# Patient Record
Sex: MALE | Race: BLACK OR AFRICAN AMERICAN | NOT HISPANIC OR LATINO | Employment: FULL TIME | URBAN - METROPOLITAN AREA
[De-identification: names, ages, dates, MRNs, and addresses within clinical notes are randomized per-mention and may not be internally consistent; named-entity substitution may affect disease eponyms.]

---

## 2017-03-07 DIAGNOSIS — I10 HTN, GOAL BELOW 140/90: Primary | ICD-10-CM

## 2017-03-07 DIAGNOSIS — I10 ESSENTIAL HYPERTENSION: ICD-10-CM

## 2017-03-07 RX ORDER — AMLODIPINE BESYLATE 10 MG/1
TABLET ORAL
Qty: 45 TABLET | Refills: 0 | Status: SHIPPED | OUTPATIENT
Start: 2017-03-07 | End: 2017-03-07 | Stop reason: DRUGHIGH

## 2017-03-07 RX ORDER — AMLODIPINE BESYLATE 5 MG/1
5 TABLET ORAL DAILY
Qty: 90 TABLET | Refills: 3 | Status: SHIPPED | OUTPATIENT
Start: 2017-03-07 | End: 2018-03-19

## 2017-03-07 NOTE — TELEPHONE ENCOUNTER
Amlodipine 10 mg      Last Written Prescription Date: 03/02/16 (the order from 11/23/16 was historical and not sent to any pharmacy)  Last Fill Quantity: 90, # refills: 3    Last Office Visit with G, P or [facility] prescribing provider:  11/23/16   Future Office Visit:        BP Readings from Last 3 Encounters:   11/23/16 136/85   09/08/16 129/82   06/08/16 (!) 134/92       It looks like the strength was decreased to 5 mg at the 11/23/16 OV. Please send a new order for the current dose. Thanks!    Myah Lyons CPhT      [facility]  [address] [place] [address]    Phone [phone]  Fax [phone]

## 2017-04-11 DIAGNOSIS — I10 HTN, GOAL BELOW 140/90: ICD-10-CM

## 2017-11-27 ENCOUNTER — APPOINTMENT (OUTPATIENT)
Dept: GENERAL RADIOLOGY | Facility: CLINIC | Age: 42
End: 2017-11-27
Attending: EMERGENCY MEDICINE

## 2017-11-27 ENCOUNTER — HOSPITAL ENCOUNTER (EMERGENCY)
Facility: CLINIC | Age: 42
Discharge: HOME | End: 2017-11-27
Attending: EMERGENCY MEDICINE | Admitting: EMERGENCY MEDICINE

## 2017-11-27 VITALS
RESPIRATION RATE: 16 BRPM | DIASTOLIC BLOOD PRESSURE: 92 MMHG | HEART RATE: 105 BPM | SYSTOLIC BLOOD PRESSURE: 131 MMHG | TEMPERATURE: 98.2 F | WEIGHT: 187.4 LBS | OXYGEN SATURATION: 94 % | BODY MASS INDEX: 26.89 KG/M2

## 2017-11-27 DIAGNOSIS — J18.9 PNEUMONIA OF RIGHT MIDDLE LOBE DUE TO INFECTIOUS ORGANISM: ICD-10-CM

## 2017-11-27 PROCEDURE — 71020 XR CHEST 2 VW: CPT

## 2017-11-27 PROCEDURE — 25000132 ZZH RX MED GY IP 250 OP 250 PS 637: Performed by: EMERGENCY MEDICINE

## 2017-11-27 PROCEDURE — 99283 EMERGENCY DEPT VISIT LOW MDM: CPT | Mod: 25 | Performed by: EMERGENCY MEDICINE

## 2017-11-27 PROCEDURE — 99284 EMERGENCY DEPT VISIT MOD MDM: CPT | Mod: Z6 | Performed by: EMERGENCY MEDICINE

## 2017-11-27 RX ORDER — IBUPROFEN 600 MG/1
600 TABLET, FILM COATED ORAL ONCE
Status: COMPLETED | OUTPATIENT
Start: 2017-11-27 | End: 2017-11-27

## 2017-11-27 RX ORDER — ACETAMINOPHEN 325 MG/1
975 TABLET ORAL ONCE
Status: COMPLETED | OUTPATIENT
Start: 2017-11-27 | End: 2017-11-27

## 2017-11-27 RX ORDER — DOXYCYCLINE 100 MG/1
100 CAPSULE ORAL 2 TIMES DAILY
Qty: 14 CAPSULE | Refills: 0 | Status: SHIPPED | OUTPATIENT
Start: 2017-11-27 | End: 2017-12-04

## 2017-11-27 RX ADMIN — IBUPROFEN 600 MG: 600 TABLET ORAL at 19:43

## 2017-11-27 RX ADMIN — ACETAMINOPHEN 975 MG: 325 TABLET, FILM COATED ORAL at 19:43

## 2017-11-27 ASSESSMENT — ENCOUNTER SYMPTOMS
ABDOMINAL PAIN: 0
FEVER: 0
COUGH: 1
CONFUSION: 0
DIFFICULTY URINATING: 0
ARTHRALGIAS: 0
SLEEP DISTURBANCE: 1
COLOR CHANGE: 0
SHORTNESS OF BREATH: 0
NECK STIFFNESS: 0
HEADACHES: 1
EYE REDNESS: 0

## 2017-11-27 NOTE — ED AVS SNAPSHOT
[facility], [place], Emergency Department    [address]    [facility] [address]    Phone:  [phone]    Fax:  [phone]                                       Vasiliy Hay   MRN: [id number]    Department:  [facility], Emergency Department   Date of Visit:  11/27/2017           After Visit Summary Signature Page     I have received my discharge instructions, and my questions have been answered. I have discussed any challenges I see with this plan with the nurse or doctor.    ..........................................................................................................................................  Patient/Patient Representative Signature      ..........................................................................................................................................  Patient Representative Print Name and Relationship to Patient    ..................................................               ................................................  Date                                            Time    ..........................................................................................................................................  Reviewed by Signature/Title    ...................................................              ..............................................  Date                                                            Time

## 2017-11-27 NOTE — ED AVS SNAPSHOT
[facility], Emergency Department    [address]    [place] [address]    Phone:  [phone]    Fax:  [phone]                                       Vasiliy Hay   MRN: [id number]    Department:  [facility], Emergency Department   Date of Visit:  11/27/2017           Patient Information     Date Of Birth          1975        Your diagnoses for this visit were:     Pneumonia of right middle lobe due to infectious organism (H)        You were seen by Frederic Garrido MD.        Discharge Instructions       You have pneumonia in the right lung  Start antibiotics as directed  Follow-up with your doctor in 1 week    24 Hour Appointment Hotline       To make an appointment at any [facility] clinic, call [phone]). If you don't have a family doctor or clinic, we will help you find one. [facility] clinics are conveniently located to serve the needs of you and your family.             Review of your medicines      START taking        Dose / Directions Last dose taken    doxycycline 100 MG capsule   Commonly known as:  VIBRAMYCIN   Dose:  100 mg   Quantity:  14 capsule        Take 1 capsule (100 mg) by mouth 2 times daily for 7 days   Refills:  0          Our records show that you are taking the medicines listed below. If these are incorrect, please call your family doctor or clinic.        Dose / Directions Last dose taken    amLODIPine 5 MG tablet   Commonly known as:  NORVASC   Dose:  5 mg   Quantity:  90 tablet        Take 1 tablet (5 mg) by mouth daily   Refills:  3        TUMS PO        Take  by mouth as needed.   Refills:  0                Prescriptions were sent or printed at these locations (1 Prescription)                   Other Prescriptions                Printed at Department/Unit printer (1 of 1)         doxycycline (VIBRAMYCIN) 100 MG capsule                Procedures and tests performed during your visit     XR Chest 2 Views      Orders Needing  Specimen Collection     None      Pending Results     Date and Time Order Name Status Description    11/27/2017 1858 XR Chest 2 Views Preliminary             Pending Culture Results     No orders found from 11/25/2017 to 11/28/2017.            Pending Results Instructions     If you had any lab results that were not finalized at the time of your Discharge, you can call the ED Lab Result RN at [phone]. You will be contacted by this team for any positive Lab results or changes in treatment. The nurses are available 7 days a week from 10A to 6:30P.  You can leave a message 24 hours per day and they will return your call.        Thank you for choosing [facility]       Thank you for choosing [facility] for your care. Our goal is always to provide you with excellent care. Hearing back from our patients is one way we can continue to improve our services. Please take a few minutes to complete the written survey that you may receive in the mail after you visit with us. Thank you!        [employer] Information     [employer] gives you secure access to your electronic health record. If you see a primary care provider, you can also send messages to your care team and make appointments. If you have questions, please call your primary care clinic.  If you do not have a primary care provider, please call [phone] and they will assist you.        Care EveryWhere ID     This is your Care EveryWhere ID. This could be used by other organizations to access your [facility] medical records  [id number]        Equal Access to Services     SALEEM AYALA : Jade Helms, waaxda jose l, qaybta kaaltori bailey. So [facility] [phone].    ATENCIÓN: Si habla español, tiene a snow disposición servicios gratuitos de asistencia lingüística. Llame al [phone].    We comply with applicable federal civil rights laws and [place] laws. We do not discriminate on the basis of race, color,  national origin, age, disability, sex, sexual orientation, or gender identity.            After Visit Summary       This is your record. Keep this with you and show to your community pharmacist(s) and doctor(s) at your next visit.

## 2017-11-27 NOTE — ED NOTES
Patient reports cough x9 days, c/o chest wall pain and epigastric pain with coughing/taking deep breaths.  Patient reports intermittent fever which is responsive to tylenol.  Patient denies change in appetite or runny nose.  Patient traveled to [place] and returned in October, has had no ill contacts.  Patient received a flu vaccine this year.

## 2017-11-28 NOTE — DISCHARGE INSTRUCTIONS
You have pneumonia in the right lung  Start antibiotics as directed  Follow-up with your doctor in 1 week

## 2017-11-28 NOTE — ED PROVIDER NOTES
History     Chief Complaint   Patient presents with     Cough     Patient reports dry, slightly productive cough which has been going on for 9 days.  Patient reports slightly yellow sputum, upper abdominal pain with coughing.     Chest Wall Pain     HPI  Vasiliy Hay is a 42 year old male with a history of hypertension who presents for evaluation of a cough. Patient complains of a minimally productive cough which has been ongoing for the past nine days. He has had associated headache and difficulty sleeping. He has used cough syrup for his symptoms with no relief. He did receive immunizations as a child, but is unsure which ones these were. He is a non-smoker. No other symptoms or complaints at this time.     I have reviewed the Medications, Allergies, Past Medical and Surgical History, and Social History in the [employer] system.  Past Medical History:   Diagnosis Date     CARDIOVASCULAR SCREENING; LDL GOAL LESS THAN 130      Hypertension      Positive TB test        Past Surgical History:   Procedure Laterality Date     ORTHOPEDIC SURGERY      L finger s/p injury       No family history on file.    Social History   Substance Use Topics     Smoking status: Never Smoker     Smokeless tobacco: Never Used     Alcohol use No       No current facility-administered medications for this encounter.      Current Outpatient Prescriptions   Medication     doxycycline (VIBRAMYCIN) 100 MG capsule     amLODIPine (NORVASC) 5 MG tablet     [DISCONTINUED] hydrochlorothiazide (HYDRODIURIL) 25 MG tablet     Calcium Carbonate Antacid (TUMS PO)      No Known Allergies    Review of Systems   Constitutional: Negative for fever.   HENT: Negative for congestion.    Eyes: Negative for redness.   Respiratory: Positive for cough. Negative for shortness of breath.    Cardiovascular: Negative for chest pain.   Gastrointestinal: Negative for abdominal pain.   Genitourinary: Negative for difficulty urinating.   Musculoskeletal: Negative for  arthralgias and neck stiffness.   Skin: Negative for color change.   Neurological: Positive for headaches.   Psychiatric/Behavioral: Positive for sleep disturbance. Negative for confusion.   All other systems reviewed and are negative.      Physical Exam   BP: 145/86  Pulse: 105  Heart Rate: 93  Temp: 98.3  F (36.8  C)  Resp: 16  Weight: 85 kg (187 lb 6.4 oz)  SpO2: 97 %      Physical Exam   Constitutional: He is oriented to person, place, and time. He appears well-developed and well-nourished. No distress.   Cardiovascular: Normal rate, regular rhythm and normal heart sounds.    Pulmonary/Chest: No respiratory distress. He has rhonchi in the right upper field, the right middle field and the right lower field.   Neurological: He is alert and oriented to person, place, and time.   Nursing note and vitals reviewed.      ED Course     ED Course     Procedures       7:10 PM  The patient was seen and examined by Dr. Garrido in Room 1.     Results for orders placed or performed during the hospital encounter of 11/27/17   XR Chest 2 Views    Narrative    CHEST TWO VIEWS   11/27/2017 7:30 PM      HISTORY: Cough.    COMPARISON: 4/13/2012.    FINDINGS: The heart size is normal. There is a right middle lobe  consolidation. The lungs are otherwise clear. No pneumothorax or  pleural effusion.      Impression    IMPRESSION: Right middle lobe pneumonia.             Labs Ordered and Resulted from Time of ED Arrival Up to the Time of Departure from the ED - No data to display         Assessments & Plan (with Medical Decision Making)   42 year old male with riddle middle lobe pneumonia. He is not hypoxic. Will treat with doxycycline. This is a simple community acquired pneumonia. Routine follow up recommended. No excessive work of breathing. No pulmonary disease, non-smoker.     I have reviewed the nursing notes.    I have reviewed the findings, diagnosis, plan and need for follow up with the patient.    Discharge Medication List  as of 11/27/2017  7:49 PM      START taking these medications    Details   doxycycline (VIBRAMYCIN) 100 MG capsule Take 1 capsule (100 mg) by mouth 2 times daily for 7 days, Disp-14 capsule, R-0, Local Print             Final diagnoses:   Pneumonia of right middle lobe due to infectious organism (H)   IBrittany, am serving as a trained medical scribe to document services personally performed by John Garrido MD, based on the provider's statements to me.   IJohn MD, was physically present and have reviewed and verified the accuracy of this note documented by Brittany Gates.      11/27/2017   [facility], [place], EMERGENCY DEPARTMENT     Frederic Garrido MD  11/27/17 1953

## 2017-12-28 ENCOUNTER — OFFICE VISIT (OUTPATIENT)
Dept: FAMILY MEDICINE | Facility: CLINIC | Age: 42
End: 2017-12-28

## 2017-12-28 VITALS
TEMPERATURE: 98 F | HEART RATE: 90 BPM | OXYGEN SATURATION: 97 % | RESPIRATION RATE: 16 BRPM | HEIGHT: 71 IN | WEIGHT: 187 LBS | DIASTOLIC BLOOD PRESSURE: 86 MMHG | BODY MASS INDEX: 26.18 KG/M2 | SYSTOLIC BLOOD PRESSURE: 136 MMHG

## 2017-12-28 DIAGNOSIS — R09.89 RHONCHI AT LEFT LUNG BASE: Primary | ICD-10-CM

## 2017-12-28 DIAGNOSIS — R06.2 WHEEZING: ICD-10-CM

## 2017-12-28 DIAGNOSIS — R05.9 COUGH: ICD-10-CM

## 2017-12-28 DIAGNOSIS — J18.9 PNEUMONIA DUE TO INFECTIOUS ORGANISM, UNSPECIFIED LATERALITY, UNSPECIFIED PART OF LUNG: ICD-10-CM

## 2017-12-28 DIAGNOSIS — Z23 NEED FOR PROPHYLACTIC VACCINATION WITH TETANUS-DIPHTHERIA (TD): ICD-10-CM

## 2017-12-28 DIAGNOSIS — Z23 NEED FOR PROPHYLACTIC VACCINATION AND INOCULATION AGAINST INFLUENZA: ICD-10-CM

## 2017-12-28 LAB
FEF 25/75: NORMAL
FEV-1: NORMAL
FEV1/FVC: NORMAL
FVC: NORMAL

## 2017-12-28 PROCEDURE — 99214 OFFICE O/P EST MOD 30 MIN: CPT | Mod: 25 | Performed by: NURSE PRACTITIONER

## 2017-12-28 PROCEDURE — 94640 AIRWAY INHALATION TREATMENT: CPT | Performed by: NURSE PRACTITIONER

## 2017-12-28 RX ORDER — DOXYCYCLINE HYCLATE 100 MG
100 TABLET ORAL 2 TIMES DAILY
Qty: 14 TABLET | Refills: 0 | Status: SHIPPED | OUTPATIENT
Start: 2017-12-28 | End: 2021-07-26

## 2017-12-28 RX ORDER — ALBUTEROL SULFATE 0.83 MG/ML
1 SOLUTION RESPIRATORY (INHALATION) EVERY 4 HOURS PRN
Qty: 25 VIAL | Refills: 0
Start: 2017-12-28 | End: 2021-07-26

## 2017-12-28 RX ORDER — CODEINE PHOSPHATE AND GUAIFENESIN 10; 100 MG/5ML; MG/5ML
1 SOLUTION ORAL EVERY 4 HOURS PRN
Qty: 120 ML | Refills: 0 | Status: SHIPPED | OUTPATIENT
Start: 2017-12-28 | End: 2021-07-26

## 2017-12-28 NOTE — NURSING NOTE
The following nebulizer treatment was given:     MEDICATION: Albuterol Sulfate 2.5 mg  : [facility]  LOT #: [id number]  EXPIRATION DATE:  04/01/19  NDC # 3624-2261-59    Alexandrea Beckford CMA

## 2017-12-28 NOTE — MR AVS SNAPSHOT
After Visit Summary   12/28/2017    Vasiliy Hay    MRN: [id number]           Patient Information     Date Of Birth          1975        Visit Information        Provider Department      12/28/2017 12:40 PM Leni Villanueva APRN [facility]        Today's Diagnoses     Rhonchi at left lung base    -  1    Need for prophylactic vaccination and inoculation against influenza        Need for prophylactic vaccination with tetanus-diphtheria (TD)        Cough        Wheezing          Care Instructions    We will do an Xray to see if you have pneumonia and discuss the next steps afterwards. Here are other things you can do to help:    Drink plenty of extra fluids, honey soothes the cough (honey and lemon in tea is great), and cold fluids help keep swelling down.     Gargle salt water a few times a day, some studies show this can kill the virus sooner. Chicken noodle soup, and good nutrition in small amounts. Vitamin C and Zinc can help boost your immune system and can be taken short term while you fight this off. Extra fluids and rest also help your body fight off the virus.     Do saline washes with neti pot or saline nasal spray a few times throughout the day    Over the counter decongestants you can try:   Oxymetazolone nasal spray  (Afrin or generic is fine):  two sprays twice daily for 3-4 days then stop to avoid rebound congestion.    OR  Pseudoephedrine: 30mg three times daily by mouth for 1-2 weeks. You need to go to the Pharmacist counter and show your ID to get this medication. Do not take if you have high blood pressure, and this medication may also keep you awake.     I would also recommend to thin mucous:  Guaifenesin 100mg/teaspoon, 1-2 teaspoons four times daily for one week as needed.     For pain and discomfort:  Ibuprofen 600mg three times daily for 5-7 days (anti-inflammatory) to decrease swelling and help with pain of sinuses, nose, throat and chest. If you  "have Diabetes or Kidney issues, do not take this or any other NSAID     Return to clinic at any time if you develop high fevers, if worsening or in 1-2 weeks if not improving.              Follow-ups after your visit        Future tests that were ordered for you today     Open Future Orders        Priority Expected Expires Ordered    XR Chest 2 Views STAT 12/28/2017 12/28/2018 12/28/2017            Who to contact     If you have questions or need follow up information about today's clinic visit or your schedule please contact [facility] [place] at [phone].  Normal or non-critical lab and imaging results will be communicated to you by [employer], letter or phone within 4 business days after the clinic has received the results. If you do not hear from us within 7 days, please contact the clinic through [employer] or phone. If you have a critical or abnormal lab result, we will notify you by phone as soon as possible.  Submit refill requests through [employer] or call your pharmacy and they will forward the refill request to us. Please allow 3 business days for your refill to be completed.          Additional Information About Your Visit        MyChart Information     [employer] gives you secure access to your electronic health record. If you see a primary care provider, you can also send messages to your care team and make appointments. If you have questions, please call your primary care clinic.  If you do not have a primary care provider, please call [phone] and they will assist you.        Care EveryWhere ID     This is your Care EveryWhere ID. This could be used by other organizations to access your [facility] medical records  [id number]        Your Vitals Were     Pulse Temperature Respirations Height Pulse Oximetry BMI (Body Mass Index)    90 98  F (36.7  C) (Oral) 16 5' 10.5\" (1.791 m) 97% 26.45 kg/m2       Blood Pressure from Last 3 Encounters:   12/28/17 136/86   11/27/17 (!) 131/92   11/23/16 " 136/85    Weight from Last 3 Encounters:   12/28/17 187 lb (84.8 kg)   11/27/17 187 lb 6.4 oz (85 kg)   11/23/16 185 lb 8 oz (84.1 kg)              We Performed the Following     INHALATION/NEBULIZER TREATMENT, INITIAL          Today's Medication Changes          These changes are accurate as of: 12/28/17  1:52 PM.  If you have any questions, ask your nurse or doctor.               Start taking these medicines.        Dose/Directions    albuterol (2.5 MG/3ML) 0.083% neb solution   Used for:  Wheezing, Cough   Started by:  Leni Villanueva APRN CNP        Dose:  1 vial   Take 1 vial (2.5 mg) by nebulization every 4 hours as needed for shortness of breath / dyspnea or wheezing   Quantity:  25 vial   Refills:  0       guaiFENesin-codeine 100-10 MG/5ML Soln solution   Commonly known as:  ROBITUSSIN AC   Used for:  Cough   Started by:  Leni Villanueva APRN CNP        Dose:  1 tsp.   Take 5 mLs by mouth every 4 hours as needed for cough   Quantity:  120 mL   Refills:  0            Where to get your medicines      Some of these will need a paper prescription and others can be bought over the counter.  Ask your nurse if you have questions.     Bring a paper prescription for each of these medications     guaiFENesin-codeine 100-10 MG/5ML Soln solution       You don't need a prescription for these medications     albuterol (2.5 MG/3ML) 0.083% neb solution                Primary Care Provider Office Phone # Fax #    David Zafar [phone]       [address] [address]        Equal Access to Services     [facility] AH: Hadtobi Helms, jarad domingo, tori schwartz. So [facility] [phone].    ATENCIÓN: Si habla español, tiene a snow disposición servicios gratuitos de asistencia lingüística. Erasmo al [phone].    We comply with applicable federal civil rights laws and [place] laws. We do not  discriminate on the basis of race, color, national origin, age, disability, sex, sexual orientation, or gender identity.            Thank you!     Thank you for choosing [facility]  [place] your [place]. Our goal is always to provide you with excellent care. Hearing back from our patients is one way we can continue to improve our services. Please take a few minutes to complete the written survey that you may receive in the mail after your visit with us. Thank you!             Your Updated Medication List - Protect others around you: Learn how to safely use, store and throw away your medicines at www.disposemymeds.org.          This list is accurate as of: 12/28/17  1:52 PM.  Always use your most recent med list.                   Brand Name Dispense Instructions for use Diagnosis    albuterol (2.5 MG/3ML) 0.083% neb solution     25 vial    Take 1 vial (2.5 mg) by nebulization every 4 hours as needed for shortness of breath / dyspnea or wheezing    Wheezing, Cough       amLODIPine 5 MG tablet    NORVASC    90 tablet    Take 1 tablet (5 mg) by mouth daily    HTN, goal below 140/90       guaiFENesin-codeine 100-10 MG/5ML Soln solution    ROBITUSSIN AC    120 mL    Take 5 mLs by mouth every 4 hours as needed for cough    Cough       TUMS PO      Take  by mouth as needed.

## 2017-12-28 NOTE — NURSING NOTE
"Chief Complaint   Patient presents with     Cough       Initial /86 (BP Location: Left arm)  Pulse 90  Temp 98  F (36.7  C) (Oral)  Resp 16  Ht 5' 10.5\" (1.791 m)  Wt 187 lb (84.8 kg)  SpO2 97%  BMI 26.45 kg/m2 Estimated body mass index is 26.45 kg/(m^2) as calculated from the following:    Height as of this encounter: 5' 10.5\" (1.791 m).    Weight as of this encounter: 187 lb (84.8 kg).  Medication Reconciliation: complete     Alexandrea Beckford CMA      "

## 2017-12-28 NOTE — PATIENT INSTRUCTIONS
We will do an Xray to see if you have pneumonia and discuss the next steps afterwards. Here are other things you can do to help:    Drink plenty of extra fluids, honey soothes the cough (honey and lemon in tea is great), and cold fluids help keep swelling down.     Gargle salt water a few times a day, some studies show this can kill the virus sooner. Chicken noodle soup, and good nutrition in small amounts. Vitamin C and Zinc can help boost your immune system and can be taken short term while you fight this off. Extra fluids and rest also help your body fight off the virus.     Do saline washes with neti pot or saline nasal spray a few times throughout the day    Over the counter decongestants you can try:   Oxymetazolone nasal spray  (Afrin or generic is fine):  two sprays twice daily for 3-4 days then stop to avoid rebound congestion.    OR  Pseudoephedrine: 30mg three times daily by mouth for 1-2 weeks. You need to go to the Pharmacist counter and show your ID to get this medication. Do not take if you have high blood pressure, and this medication may also keep you awake.     I would also recommend to thin mucous:  Guaifenesin 100mg/teaspoon, 1-2 teaspoons four times daily for one week as needed.     For pain and discomfort:  Ibuprofen 600mg three times daily for 5-7 days (anti-inflammatory) to decrease swelling and help with pain of sinuses, nose, throat and chest. If you have Diabetes or Kidney issues, do not take this or any other NSAID     Return to clinic at any time if you develop high fevers, if worsening or in 1-2 weeks if not improving.

## 2017-12-28 NOTE — PROGRESS NOTES
SUBJECTIVE:   Vasiliy Hay is a 42 year old male who presents to clinic today for the following health issues:      Acute Illness   Acute illness concerns: Cough  Onset: 1 month    Fever: no    Chills/Sweats: no    Headache (location?): no    Sinus Pressure:no    Conjunctivitis:  no    Ear Pain: no    Rhinorrhea: no     Congestion: no     Sore Throat: YES     Cough: YES-productive of yellow sputum    Wheeze: no    Decreased Appetite: no    Nausea: no     Vomiting: no     Diarrhea:  no     Dysuria/Freq.: no     Fatigue/Achiness: no    Sick/Strep Exposure: no     Therapies Tried and outcome: none    Felt better after antibiotics but starting to cough again 3-7 days ago  2 kids were sick after him and they had pneumonia now and they are free and clear now no cough    No smoking   No asthma   TUMS occasionally   No allergy history         Problem list and histories reviewed & adjusted, as indicated.  Additional history: as documented    Patient Active Problem List   Diagnosis     Positive TB test     CARDIOVASCULAR SCREENING; LDL GOAL LESS THAN 130     HTN, goal below 140/90     Abnormal laboratory test result     Past Surgical History:   Procedure Laterality Date     ORTHOPEDIC SURGERY      L finger s/p injury       Social History   Substance Use Topics     Smoking status: Never Smoker     Smokeless tobacco: Never Used     Alcohol use No     No family history on file.      Current Outpatient Prescriptions   Medication Sig Dispense Refill     guaiFENesin-codeine (ROBITUSSIN AC) 100-10 MG/5ML SOLN solution Take 5 mLs by mouth every 4 hours as needed for cough 120 mL 0     albuterol (2.5 MG/3ML) 0.083% neb solution Take 1 vial (2.5 mg) by nebulization every 4 hours as needed for shortness of breath / dyspnea or wheezing 25 vial 0     doxycycline (VIBRA-TABS) 100 MG tablet Take 1 tablet (100 mg) by mouth 2 times daily 14 tablet 0     amLODIPine (NORVASC) 5 MG tablet Take 1 tablet (5 mg) by mouth daily 90 tablet 3  "    Calcium Carbonate Antacid (TUMS PO) Take  by mouth as needed.       [DISCONTINUED] hydrochlorothiazide (HYDRODIURIL) 25 MG tablet Take 1 tablet (25 mg) by mouth daily 90 tablet 3     No Known Allergies  BP Readings from Last 3 Encounters:   12/28/17 136/86   11/27/17 (!) 131/92   11/23/16 136/85    Wt Readings from Last 3 Encounters:   12/28/17 187 lb (84.8 kg)   11/27/17 187 lb 6.4 oz (85 kg)   11/23/16 185 lb 8 oz (84.1 kg)                  Labs reviewed in EPIC        Reviewed and updated as needed this visit by clinical staff     Reviewed and updated as needed this visit by Provider         ROS:  Constitutional, HEENT, cardiovascular, pulmonary, GI, , musculoskeletal, neuro, skin, endocrine and psych systems are negative, except as otherwise noted.      OBJECTIVE:   /86 (BP Location: Left arm)  Pulse 90  Temp 98  F (36.7  C) (Oral)  Resp 16  Ht 5' 10.5\" (1.791 m)  Wt 187 lb (84.8 kg)  SpO2 97%  BMI 26.45 kg/m2  Body mass index is 26.45 kg/(m^2).  GENERAL: mildly ill appearing, slightly fatigued, alert and in no distress  EYES: Eyes grossly normal to inspection, no discharge. Extraocular movements - intact, and PERRL  HENT: Normocephalic, ear canals- normal; TMs- normal ; No sinus tenderness  Nose- normal with clear rhinnorhea; oropharynx clear without erythema or exudates, Mouth- no ulcers, no lesions and mucous membranes moist  NECK: no tenderness, no adenopathy, no asymmetry, no masses, no stiffness  RESP: lungs with rhonchi at LLL and wheezes scattered, somewhat improved after neb. O2 sats remain above 95%and no increased wob  CV: regular rates and rhythm, normal S1 S2, no S3 or S4 and no murmur, no click or rub - peripheral pulses normal and brisk cap refill   ABDOMEN: soft, no tenderness, no hepatosplenomegaly, no masses, normal bowel sounds  MS: extremities- no gross deformities noted, no edema  SKIN: no suspicious lesions, no rashes, good skin turgor  NEURO: strength and tone- normal, " sensory exam- grossly normal, mentation appears        Diagnostic Test Results:  CXR - pt did not have this done    ASSESSMENT/PLAN:         ICD-10-CM    1. Rhonchi at left lung base R09.89 XR Chest 2 Views   2. Need for prophylactic vaccination and inoculation against influenza Z23    3. Need for prophylactic vaccination with tetanus-diphtheria (TD) Z23    4. Cough R05 XR Chest 2 Views     guaiFENesin-codeine (ROBITUSSIN AC) 100-10 MG/5ML SOLN solution     albuterol (2.5 MG/3ML) 0.083% neb solution     INHALATION/NEBULIZER TREATMENT, INITIAL   5. Wheezing R06.2 albuterol (2.5 MG/3ML) 0.083% neb solution     INHALATION/NEBULIZER TREATMENT, INITIAL   6. Pneumonia due to infectious organism, unspecified laterality, unspecified part of lung J18.9 doxycycline (VIBRA-TABS) 100 MG tablet     Pt called and did not have time to do CXR, is stable overall. Made decision to treat based on symptoms, will have CXR in 1 month to follow up and be sure this has resolved. Follow up earlier if not resolving as expected on antibiotics, finish entire course and   See below for instructions we reviewed   Patient Instructions   We will do an Xray to see if you have pneumonia and discuss the next steps afterwards. Here are other things you can do to help:    Drink plenty of extra fluids, honey soothes the cough (honey and lemon in tea is great), and cold fluids help keep swelling down.     Gargle salt water a few times a day, some studies show this can kill the virus sooner. Chicken noodle soup, and good nutrition in small amounts. Vitamin C and Zinc can help boost your immune system and can be taken short term while you fight this off. Extra fluids and rest also help your body fight off the virus.     Do saline washes with neti pot or saline nasal spray a few times throughout the day    Over the counter decongestants you can try:   Oxymetazolone nasal spray  (Afrin or generic is fine):  two sprays twice daily for 3-4 days then stop to  avoid rebound congestion.    OR  Pseudoephedrine: 30mg three times daily by mouth for 1-2 weeks. You need to go to the Pharmacist counter and show your ID to get this medication. Do not take if you have high blood pressure, and this medication may also keep you awake.     I would also recommend to thin mucous:  Guaifenesin 100mg/teaspoon, 1-2 teaspoons four times daily for one week as needed.     For pain and discomfort:  Ibuprofen 600mg three times daily for 5-7 days (anti-inflammatory) to decrease swelling and help with pain of sinuses, nose, throat and chest. If you have Diabetes or Kidney issues, do not take this or any other NSAID     Return to clinic at any time if you develop high fevers, if worsening or in 1-2 weeks if not improving.      25 min spent in direct face to face time with this pt, greater than 50% in counseling and coordination of care of above diagnoses      FRANSISCO Michelle CNP  [facility]

## 2018-03-19 DIAGNOSIS — I10 HTN, GOAL BELOW 140/90: ICD-10-CM

## 2018-03-19 RX ORDER — AMLODIPINE BESYLATE 5 MG/1
5 TABLET ORAL DAILY
Qty: 90 TABLET | Refills: 2 | Status: SHIPPED | OUTPATIENT
Start: 2018-03-19 | End: 2019-01-18

## 2018-03-19 NOTE — TELEPHONE ENCOUNTER
Medication is being filled for 1 time refill only due to:  Future labs ordered comprehensive metabolic panel.     Joan Stahl RN  [facility]

## 2018-03-19 NOTE — TELEPHONE ENCOUNTER
"Requested Prescriptions   Pending Prescriptions Disp Refills     amLODIPine (NORVASC) 5 MG tablet 90 tablet 3    Last Written Prescription Date:  3/7/17  Last Fill Quantity: 90,  # refills: 3   Last office visit: No previous visit found with prescribing provider:  12/28/17  Future Office Visit:     Sig: Take 1 tablet (5 mg) by mouth daily    Calcium Channel Blockers Protocol  Failed    3/19/2018  2:15 PM       Failed - Normal serum creatinine on file in past 12 months    Recent Labs   Lab Test  12/03/15   0904   CR  0.70            Passed - Blood pressure under 140/90 in past 12 months    BP Readings from Last 3 Encounters:   12/28/17 136/86   11/27/17 (!) 131/92   11/23/16 136/85                Passed - Recent (12 mo) or future (30 days) visit within the authorizing provider's specialty    Patient had office visit in the last 12 months or has a visit in the next 30 days with authorizing provider or within the authorizing provider's specialty.  See \"Patient Info\" tab in inbasket, or \"Choose Columns\" in Meds & Orders section of the refill encounter.           Passed - Patient is age 18 or older          "

## 2018-03-19 NOTE — TELEPHONE ENCOUNTER
Routing refill request to provider for review/approval because:  Labs not current:  CR    Lab cued  Can this be a lab only appointment  Pt is out of medication    Malena Quinones, RN   [facility]

## 2018-09-26 ENCOUNTER — OFFICE VISIT (OUTPATIENT)
Dept: FAMILY MEDICINE | Facility: CLINIC | Age: 43
End: 2018-09-26
Payer: COMMERCIAL

## 2018-09-26 VITALS
SYSTOLIC BLOOD PRESSURE: 141 MMHG | WEIGHT: 175 LBS | TEMPERATURE: 97.4 F | BODY MASS INDEX: 24.75 KG/M2 | RESPIRATION RATE: 16 BRPM | OXYGEN SATURATION: 100 % | DIASTOLIC BLOOD PRESSURE: 94 MMHG | HEART RATE: 76 BPM

## 2018-09-26 DIAGNOSIS — R80.9 PROTEINURIA, UNSPECIFIED TYPE: ICD-10-CM

## 2018-09-26 DIAGNOSIS — R31.29 MICROSCOPIC HEMATURIA: ICD-10-CM

## 2018-09-26 DIAGNOSIS — R35.1 NOCTURIA: ICD-10-CM

## 2018-09-26 DIAGNOSIS — R35.0 URINARY FREQUENCY: ICD-10-CM

## 2018-09-26 DIAGNOSIS — G47.00 INSOMNIA, UNSPECIFIED TYPE: ICD-10-CM

## 2018-09-26 DIAGNOSIS — Z11.3 SCREEN FOR STD (SEXUALLY TRANSMITTED DISEASE): ICD-10-CM

## 2018-09-26 DIAGNOSIS — Z11.4 SCREENING FOR HIV (HUMAN IMMUNODEFICIENCY VIRUS): Primary | ICD-10-CM

## 2018-09-26 LAB
ALBUMIN SERPL-MCNC: 4.3 G/DL (ref 3.4–5)
ALBUMIN UR-MCNC: ABNORMAL MG/DL
ALP SERPL-CCNC: 64 U/L (ref 40–150)
ALT SERPL W P-5'-P-CCNC: 46 U/L (ref 0–70)
ANION GAP SERPL CALCULATED.3IONS-SCNC: 7 MMOL/L (ref 3–14)
APPEARANCE UR: CLEAR
AST SERPL W P-5'-P-CCNC: 34 U/L (ref 0–45)
BACTERIA #/AREA URNS HPF: ABNORMAL /HPF
BASOPHILS # BLD AUTO: 0 10E9/L (ref 0–0.2)
BASOPHILS NFR BLD AUTO: 0.2 %
BILIRUB SERPL-MCNC: 1.5 MG/DL (ref 0.2–1.3)
BILIRUB UR QL STRIP: NEGATIVE
BUN SERPL-MCNC: 6 MG/DL (ref 7–30)
CALCIUM SERPL-MCNC: 8.7 MG/DL (ref 8.5–10.1)
CHLORIDE SERPL-SCNC: 104 MMOL/L (ref 94–109)
CO2 SERPL-SCNC: 28 MMOL/L (ref 20–32)
COLOR UR AUTO: YELLOW
CREAT SERPL-MCNC: 0.7 MG/DL (ref 0.66–1.25)
DIFFERENTIAL METHOD BLD: NORMAL
EOSINOPHIL # BLD AUTO: 0.1 10E9/L (ref 0–0.7)
EOSINOPHIL NFR BLD AUTO: 0.9 %
ERYTHROCYTE [DISTWIDTH] IN BLOOD BY AUTOMATED COUNT: 13.4 % (ref 10–15)
GFR SERPL CREATININE-BSD FRML MDRD: >90 ML/MIN/1.7M2
GLUCOSE SERPL-MCNC: 83 MG/DL (ref 70–99)
GLUCOSE UR STRIP-MCNC: NEGATIVE MG/DL
HCT VFR BLD AUTO: 49.1 % (ref 40–53)
HGB BLD-MCNC: 16.8 G/DL (ref 13.3–17.7)
HGB UR QL STRIP: ABNORMAL
HIV 1+2 AB+HIV1 P24 AG SERPL QL IA: NONREACTIVE
KETONES UR STRIP-MCNC: NEGATIVE MG/DL
LEUKOCYTE ESTERASE UR QL STRIP: NEGATIVE
LYMPHOCYTES # BLD AUTO: 2.1 10E9/L (ref 0.8–5.3)
LYMPHOCYTES NFR BLD AUTO: 37.5 %
MCH RBC QN AUTO: 28.8 PG (ref 26.5–33)
MCHC RBC AUTO-ENTMCNC: 34.2 G/DL (ref 31.5–36.5)
MCV RBC AUTO: 84 FL (ref 78–100)
MONOCYTES # BLD AUTO: 0.6 10E9/L (ref 0–1.3)
MONOCYTES NFR BLD AUTO: 9.7 %
NEUTROPHILS # BLD AUTO: 2.9 10E9/L (ref 1.6–8.3)
NEUTROPHILS NFR BLD AUTO: 51.7 %
NITRATE UR QL: NEGATIVE
PH UR STRIP: 7.5 PH (ref 5–7)
PLATELET # BLD AUTO: 198 10E9/L (ref 150–450)
POTASSIUM SERPL-SCNC: 3.8 MMOL/L (ref 3.4–5.3)
PROT SERPL-MCNC: 8.1 G/DL (ref 6.8–8.8)
RBC # BLD AUTO: 5.84 10E12/L (ref 4.4–5.9)
RBC #/AREA URNS AUTO: ABNORMAL /HPF
SODIUM SERPL-SCNC: 139 MMOL/L (ref 133–144)
SOURCE: ABNORMAL
SP GR UR STRIP: 1.02 (ref 1–1.03)
TSH SERPL DL<=0.005 MIU/L-ACNC: 0.98 MU/L (ref 0.4–4)
UROBILINOGEN UR STRIP-ACNC: 0.2 EU/DL (ref 0.2–1)
WBC # BLD AUTO: 5.7 10E9/L (ref 4–11)
WBC #/AREA URNS AUTO: ABNORMAL /HPF

## 2018-09-26 PROCEDURE — 85025 COMPLETE CBC W/AUTO DIFF WBC: CPT | Performed by: NURSE PRACTITIONER

## 2018-09-26 PROCEDURE — 36415 COLL VENOUS BLD VENIPUNCTURE: CPT | Performed by: NURSE PRACTITIONER

## 2018-09-26 PROCEDURE — 86780 TREPONEMA PALLIDUM: CPT | Performed by: NURSE PRACTITIONER

## 2018-09-26 PROCEDURE — 84443 ASSAY THYROID STIM HORMONE: CPT | Performed by: NURSE PRACTITIONER

## 2018-09-26 PROCEDURE — 87389 HIV-1 AG W/HIV-1&-2 AB AG IA: CPT | Performed by: NURSE PRACTITIONER

## 2018-09-26 PROCEDURE — 99214 OFFICE O/P EST MOD 30 MIN: CPT | Performed by: NURSE PRACTITIONER

## 2018-09-26 PROCEDURE — 80053 COMPREHEN METABOLIC PANEL: CPT | Performed by: NURSE PRACTITIONER

## 2018-09-26 PROCEDURE — 87491 CHLMYD TRACH DNA AMP PROBE: CPT | Performed by: NURSE PRACTITIONER

## 2018-09-26 PROCEDURE — 87591 N.GONORRHOEAE DNA AMP PROB: CPT | Performed by: NURSE PRACTITIONER

## 2018-09-26 PROCEDURE — 81001 URINALYSIS AUTO W/SCOPE: CPT | Performed by: NURSE PRACTITIONER

## 2018-09-26 NOTE — PROGRESS NOTES
"  SUBJECTIVE:   Vasiliy Hay is a 43 year old male who presents to clinic today for the following health issues:    Genitourinary - Male  Onset: more than a year    Description:   Dysuria (painful urination): no   Hematuria (blood in urine): no   Frequency: YES- 3-4 times at night, only at night  Are you urinating at night : YES  Hesitancy (delay in urine): no   Retention (unable to empty): no   Decrease in urinary flow: no   Incontinence: no     Progression of Symptoms:  same    Accompanying Signs & Symptoms: legs feel warm when sleep  Fever: no   Back/Flank pain: no   Urethral discharge: no   Testicle lumps/masses/pain: no   Nausea and/or vomiting: no   Abdominal pain: no     History:   History of frequent UTI's: no   History of kidney stones: no   History of hernias: no   Personal or Family history of Prostate problems: no  Sexually active: no     Precipitating factors:   Drinking a lot of water    Alleviating factors:  Changed night medications to take in the morning - not helpful    No history of kidney disease or stones, std or genital issues/ lesions  10-15 yrs ago back home had pain in low back   \"Not that much sexually active\" female partners no condoms used     No fmh of CKD or DM    Feeling exhausted when not sleeping well, does have increased BP then. He reports when he gets good sleep his BP is at goal   Sleeping poorly due to waking to pee, falls asleep okay    Has a mild cold with cough and runny nose but not here for this, no fevers    Problem list and histories reviewed & adjusted, as indicated.  Additional history: as documented    Patient Active Problem List   Diagnosis     Positive TB test     CARDIOVASCULAR SCREENING; LDL GOAL LESS THAN 130     HTN, goal below 140/90     Abnormal laboratory test result     Past Surgical History:   Procedure Laterality Date     ORTHOPEDIC SURGERY      L finger s/p injury       Social History   Substance Use Topics     Smoking status: Never Smoker     " Smokeless tobacco: Never Used     Alcohol use No     History reviewed. No pertinent family history.      Current Outpatient Prescriptions   Medication Sig Dispense Refill     albuterol (2.5 MG/3ML) 0.083% neb solution Take 1 vial (2.5 mg) by nebulization every 4 hours as needed for shortness of breath / dyspnea or wheezing 25 vial 0     amLODIPine (NORVASC) 5 MG tablet Take 1 tablet (5 mg) by mouth daily 90 tablet 2     Calcium Carbonate Antacid (TUMS PO) Take  by mouth as needed.       doxycycline (VIBRA-TABS) 100 MG tablet Take 1 tablet (100 mg) by mouth 2 times daily 14 tablet 0     guaiFENesin-codeine (ROBITUSSIN AC) 100-10 MG/5ML SOLN solution Take 5 mLs by mouth every 4 hours as needed for cough 120 mL 0     [DISCONTINUED] hydrochlorothiazide (HYDRODIURIL) 25 MG tablet Take 1 tablet (25 mg) by mouth daily 90 tablet 3     No Known Allergies  Recent Labs   Lab Test  12/03/15   0904  10/31/14   1643  06/04/14   0933   06/06/11   1203   LDL   --    --   78   --   68   HDL   --    --   44   --   39*   TRIG   --    --   131   --   195*   ALT  43  51  30   < >  27   CR  0.70  0.75  0.76   --   0.75   GFRESTIMATED  >90  Non  GFR Calc    >90  Non  GFR Calc    >90   --   >90   GFRESTBLACK  >90   GFR Calc    >90   GFR Calc    >90   --   >90   POTASSIUM  4.1  3.8  4.4   --   4.2   TSH   --    --   1.20   --    --     < > = values in this interval not displayed.      BP Readings from Last 3 Encounters:   09/26/18 (!) 141/94   12/28/17 136/86   11/27/17 (!) 131/92    Wt Readings from Last 3 Encounters:   09/26/18 175 lb (79.4 kg)   12/28/17 187 lb (84.8 kg)   11/27/17 187 lb 6.4 oz (85 kg)                  Labs reviewed in EPIC    Reviewed and updated as needed this visit by clinical staff       Reviewed and updated as needed this visit by Provider         ROS:  Constitutional, cardiovascular, pulmonary, GI, , musculoskeletal, neuro, skin, endocrine systems  are negative, except as otherwise noted.    OBJECTIVE:     BP (!) 141/94  Pulse 76  Temp 97.4  F (36.3  C) (Oral)  Resp 16  Wt 175 lb (79.4 kg)  SpO2 100%  BMI 24.75 kg/m2  Body mass index is 24.75 kg/(m^2).  GENERAL: mild upper respiratory infection wearing mask, alert and no distress  RESP: lungs clear to auscultation - no rales, rhonchi or wheezes  CV: regular rate and rhythm, normal S1 S2, no S3 or S4, no murmur, click or rub, no peripheral edema and peripheral pulses strong  ABDOMEN: soft, nontender, no hepatosplenomegaly, no masses and bowel sounds normal   (male): normal male genitalia without lesions or urethral discharge, no hernia  MS: no gross musculoskeletal defects noted, no edema  BACK: no CVA tenderness, no paralumbar tenderness    Diagnostic Test Results:  Results for orders placed or performed in visit on 09/26/18 (from the past 24 hour(s))   *UA reflex to Microscopic and Culture ([place] and [facility] (except [place] and [place])   Result Value Ref Range    Color Urine Yellow     Appearance Urine Clear     Glucose Urine Negative NEG^Negative mg/dL    Bilirubin Urine Negative NEG^Negative    Ketones Urine Negative NEG^Negative mg/dL    Specific Gravity Urine 1.020 1.003 - 1.035    Blood Urine Trace (A) NEG^Negative    pH Urine 7.5 (H) 5.0 - 7.0 pH    Protein Albumin Urine Trace (A) NEG^Negative mg/dL    Urobilinogen Urine 0.2 0.2 - 1.0 EU/dL    Nitrite Urine Negative NEG^Negative    Leukocyte Esterase Urine Negative NEG^Negative    Source Midstream Urine    Urine Microscopic   Result Value Ref Range    WBC Urine 0 - 5 OTO5^0 - 5 /HPF    RBC Urine O - 2 OTO2^O - 2 /HPF    Bacteria Urine Moderate (A) NEG^Negative /HPF   CBC with platelets differential   Result Value Ref Range    WBC 5.7 4.0 - 11.0 10e9/L    RBC Count 5.84 4.4 - 5.9 10e12/L    Hemoglobin 16.8 13.3 - 17.7 g/dL    Hematocrit 49.1 40.0 - 53.0 %    MCV 84 78 - 100 fl    MCH 28.8 26.5 - 33.0 pg    MCHC 34.2 31.5 - 36.5 g/dL     RDW 13.4 10.0 - 15.0 %    Platelet Count 198 150 - 450 10e9/L    % Neutrophils 51.7 %    % Lymphocytes 37.5 %    % Monocytes 9.7 %    % Eosinophils 0.9 %    % Basophils 0.2 %    Absolute Neutrophil 2.9 1.6 - 8.3 10e9/L    Absolute Lymphocytes 2.1 0.8 - 5.3 10e9/L    Absolute Monocytes 0.6 0.0 - 1.3 10e9/L    Absolute Eosinophils 0.1 0.0 - 0.7 10e9/L    Absolute Basophils 0.0 0.0 - 0.2 10e9/L    Diff Method Automated Method        ASSESSMENT/PLAN:         ICD-10-CM    1. Screening for HIV (human immunodeficiency virus) Z11.4 HIV Screening     Urine Microscopic   2. Urinary frequency R35.0 *UA reflex to Microscopic and Culture ([facility] (except [place] and [place])     Comprehensive metabolic panel   3. Microscopic hematuria R31.29 Comprehensive metabolic panel     CBC with platelets differential   4. Screen for STD (sexually transmitted disease) Z11.3 Treponema Abs w Reflex to RPR and Titer     NEISSERIA GONORRHOEA PCR     CHLAMYDIA TRACHOMATIS PCR   5. Nocturia R35.1    6. Proteinuria, unspecified type R80.9 Comprehensive metabolic panel   7. Insomnia, unspecified type G47.00 TSH with free T4 reflex     CBC with platelets differential   will check for std and exam normal today no other causes found, unclear etiology of nocturia and proteinuria, no flank pain or symptoms of acute abdomen, monitor and discussed ER or UC in off hours      Insomnia not worked up in the past and will do lab work to look for causes, reports his mild cold is not contributing today but did request he Return to Clinic in a couple weeks to follow up and go over results     work on sleep hygiene and good self care, push fluids but taper off before bed none after dinner    Patient Instructions   We do need to recheck the blood in your urine since this has happened before as well. Today we did STD testing to see if any sexually transmitted disease could be causing this. Your exam was otherwise normal and no urinary tract  infection today.     We also did blood work to check your kidneys, if any pain we discussed could be a sign of kidney stone so let us know or any other new symptoms.     30 min spent in direct face to face time with this pt, greater than 50% in counseling and coordination of care of above diagnoses    FRANSISCO Michelle [facility]

## 2018-09-26 NOTE — PATIENT INSTRUCTIONS
We do need to recheck the blood in your urine since this has happened before as well. Today we did STD testing to see if any sexually transmitted disease could be causing this. Your exam was otherwise normal and no urinary tract infection today.     We also did blood work to check your kidneys, if any pain we discussed could be a sign of kidney stone so let us know or any other new symptoms.

## 2018-09-26 NOTE — MR AVS SNAPSHOT
After Visit Summary   9/26/2018    Vasiliy Hay    MRN: [id number]           Patient Information     Date Of Birth          1975        Visit Information        Provider Department      9/26/2018 10:40 AM Leni Villanueva APRN CNP [facility]        Today's Diagnoses     Screening for HIV (human immunodeficiency virus)    -  1    Urinary frequency        Microscopic hematuria        Screen for STD (sexually transmitted disease)        Nocturia        Proteinuria, unspecified type        Insomnia, unspecified type          Care Instructions    We do need to recheck the blood in your urine since this has happened before as well. Today we did STD testing to see if any sexually transmitted disease could be causing this. Your exam was otherwise normal and no urinary tract infection today.     We also did blood work to check your kidneys, if any pain we discussed could be a sign of kidney stone so let us know or any other new symptoms.           Follow-ups after your visit        Follow-up notes from your care team     Return in about 1 week (around 10/3/2018) for Physical Exam and UA.      Who to contact     If you have questions or need follow up information about today's clinic visit or your schedule please contact [facility] directly at [phone].  Normal or non-critical lab and imaging results will be communicated to you by MyChart, letter or phone within 4 business days after the clinic has received the results. If you do not hear from us within 7 days, please contact the clinic through MyChart or phone. If you have a critical or abnormal lab result, we will notify you by phone as soon as possible.  Submit refill requests through [employer] or call your pharmacy and they will forward the refill request to us. Please allow 3 business days for your refill to be completed.          Additional Information About Your Visit        MyChart Information     [employer]  gives you secure access to your electronic health record. If you see a primary care provider, you can also send messages to your care team and make appointments. If you have questions, please call your primary care clinic.  If you do not have a primary care provider, please call [phone] and they will assist you.        Care EveryWhere ID     This is your Care EveryWhere ID. This could be used by other organizations to access your [facility] medical records  [id number]        Your Vitals Were     Pulse Temperature Respirations Pulse Oximetry BMI (Body Mass Index)       76 97.4  F (36.3  C) (Oral) 16 100% 24.75 kg/m2        Blood Pressure from Last 3 Encounters:   10/05/18 138/88   09/26/18 (!) 141/94   12/28/17 136/86    Weight from Last 3 Encounters:   10/05/18 179 lb (81.2 kg)   09/26/18 175 lb (79.4 kg)   12/28/17 187 lb (84.8 kg)              We Performed the Following     *UA reflex to Microscopic and Culture ([place] and [facility] (except [place] and [place])     CBC with platelets differential     CHLAMYDIA TRACHOMATIS PCR     Comprehensive metabolic panel     HIV Screening     NEISSERIA GONORRHOEA PCR     Treponema Abs w Reflex to RPR and Titer     TSH with free T4 reflex     Urine Microscopic        Primary Care Provider Office Phone # Fax #    David Zafar [phone]       [address] [facility] [address]  [facility] [address]        Equal Access to Services     GUEVARA AYALA : Hadii aad ku hadasho Soomaali, waaxda luqadaha, qaybta kaalmada adeegyada, tori bronson . So [facility] [phone].    ATENCIÓN: Si habla español, tiene a snow disposición servicios gratuitos de asistencia lingüística. Erasmo al [phone].    We comply with applicable federal civil rights laws and [place] laws. We do not discriminate on the basis of race, color, national origin, age, disability, sex, sexual orientation, or gender identity.            Thank you!     Thank you  for choosing [facility]  for your care. Our goal is always to provide you with excellent care. Hearing back from our patients is one way we can continue to improve our services. Please take a few minutes to complete the written survey that you may receive in the mail after your visit with us. Thank you!             Your Updated Medication List - Protect others around you: Learn how to safely use, store and throw away your medicines at www.disposemymeds.org.          This list is accurate as of 9/26/18 11:59 PM.  Always use your most recent med list.                   Brand Name Dispense Instructions for use Diagnosis    albuterol (2.5 MG/3ML) 0.083% neb solution     25 vial    Take 1 vial (2.5 mg) by nebulization every 4 hours as needed for shortness of breath / dyspnea or wheezing    Wheezing, Cough       amLODIPine 5 MG tablet    NORVASC    90 tablet    Take 1 tablet (5 mg) by mouth daily    HTN, goal below 140/90       doxycycline 100 MG tablet    VIBRA-TABS    14 tablet    Take 1 tablet (100 mg) by mouth 2 times daily    Pneumonia due to infectious organism, unspecified laterality, unspecified part of lung       guaiFENesin-codeine 100-10 MG/5ML Soln solution    ROBITUSSIN AC    120 mL    Take 5 mLs by mouth every 4 hours as needed for cough    Cough       TUMS PO      Take  by mouth as needed.

## 2018-09-27 LAB
C TRACH DNA SPEC QL NAA+PROBE: NEGATIVE
N GONORRHOEA DNA SPEC QL NAA+PROBE: NEGATIVE
SPECIMEN SOURCE: NORMAL
SPECIMEN SOURCE: NORMAL

## 2018-09-28 LAB — T PALLIDUM AB SER QL: NONREACTIVE

## 2018-10-02 NOTE — PROGRESS NOTES
SUBJECTIVE:   CC: Vasiliy Hay is an 43 year old male who presents for preventative health visit.     Healthy Habits:    Do you get at least three servings of calcium containing foods daily (dairy, green leafy vegetables, etc.)? yes    Amount of exercise or daily activities, outside of work: not as much     Problems taking medications regularly No    Medication side effects: No    Have you had an eye exam in the past two years? no    Do you see a dentist twice per year? no    Do you have sleep apnea, excessive snoring or daytime drowsiness?no       PROBLEMS TO ADD ON...  Had trace microhematuria and in the past proteinuria and amorphous cyrstals, ongoing intermittent symptoms including nocturia no other urinary symptoms, UA without urinary tract infection and denies std concerns, were negative.   Bili was elevated slightly in the CMP     HTN first med had dizziness-metoprolol  This med pees a lot, does take in the am     Today's PHQ-2 Score:   PHQ-2 ( 1999 [employer]) 3/2/2016 2/28/2016   Q1: Little interest or pleasure in doing things 0 -   Q2: Feeling down, depressed or hopeless 0 -   PHQ-2 Score 0 -   Q1: Little interest or pleasure in doing things - Not at all   Q2: Feeling down, depressed or hopeless - Not at all   PHQ-2 Score - 0       Abuse: Current or Past(Physical, Sexual or Emotional)- No  Do you feel safe in your environment - Yes    Social History   Substance Use Topics     Smoking status: Never Smoker     Smokeless tobacco: Never Used     Alcohol use No      If you drink alcohol do you typically have >3 drinks per day or >7 drinks per week? No                      Last PSA: No results found for: PSA    Reviewed orders with patient. Reviewed health maintenance and updated orders accordingly - Yes  Labs reviewed in EPIC  BP Readings from Last 3 Encounters:   10/05/18 138/88   09/26/18 (!) 141/94   12/28/17 136/86    Wt Readings from Last 3 Encounters:   10/05/18 179 lb (81.2 kg)   09/26/18 175 lb (79.4  kg)   12/28/17 187 lb (84.8 kg)                  Patient Active Problem List   Diagnosis     Positive TB test     CARDIOVASCULAR SCREENING; LDL GOAL LESS THAN 130     HTN, goal below 140/90     Abnormal laboratory test result     Past Surgical History:   Procedure Laterality Date     ORTHOPEDIC SURGERY      L finger s/p injury       Social History   Substance Use Topics     Smoking status: Never Smoker     Smokeless tobacco: Never Used     Alcohol use No     History reviewed. No pertinent family history.      Current Outpatient Prescriptions   Medication Sig Dispense Refill     albuterol (2.5 MG/3ML) 0.083% neb solution Take 1 vial (2.5 mg) by nebulization every 4 hours as needed for shortness of breath / dyspnea or wheezing 25 vial 0     amLODIPine (NORVASC) 5 MG tablet Take 1 tablet (5 mg) by mouth daily 90 tablet 2     Calcium Carbonate Antacid (TUMS PO) Take  by mouth as needed.       doxycycline (VIBRA-TABS) 100 MG tablet Take 1 tablet (100 mg) by mouth 2 times daily 14 tablet 0     guaiFENesin-codeine (ROBITUSSIN AC) 100-10 MG/5ML SOLN solution Take 5 mLs by mouth every 4 hours as needed for cough 120 mL 0     [DISCONTINUED] hydrochlorothiazide (HYDRODIURIL) 25 MG tablet Take 1 tablet (25 mg) by mouth daily 90 tablet 3     No Known Allergies  Recent Labs   Lab Test  09/26/18   1102  12/03/15   0904  10/31/14   1643  06/04/14   0933   06/06/11   1203   LDL   --    --    --   78   --   68   HDL   --    --    --   44   --   39*   TRIG   --    --    --   131   --   195*   ALT  46  43  51  30   < >  27   CR  0.70  0.70  0.75  0.76   --   0.75   GFRESTIMATED  >90  >90  Non African American GFR Calc    >90  Non  GFR Calc    >90   --   >90   GFRESTBLACK  >90  >90  African American GFR Calc    >90   GFR Calc    >90   --   >90   POTASSIUM  3.8  4.1  3.8  4.4   --   4.2   TSH  0.98   --    --   1.20   --    --     < > = values in this interval not displayed.        Reviewed and updated as  "needed this visit by clinical staff         Reviewed and updated as needed this visit by Provider        Past Medical History:   Diagnosis Date     CARDIOVASCULAR SCREENING; LDL GOAL LESS THAN 130      Hypertension      Positive TB test       Past Surgical History:   Procedure Laterality Date     ORTHOPEDIC SURGERY      L finger s/p injury       ROS:  CONSTITUTIONAL: NEGATIVE for fever, chills, change in weight  INTEGUMENTARY/SKIN: NEGATIVE for worrisome rashes, moles or lesions  EYES: NEGATIVE for vision changes or irritation  ENT: NEGATIVE for ear, mouth and throat problems  RESP: NEGATIVE for significant cough or SOB  CV: NEGATIVE for chest pain, palpitations or peripheral edema  GI: NEGATIVE for nausea, abdominal pain, heartburn, or change in bowel habits   male: see HPI   MUSCULOSKELETAL: NEGATIVE for significant arthralgias or myalgia  NEURO: NEGATIVE for weakness, dizziness or paresthesias  PSYCHIATRIC: NEGATIVE for changes in mood or affect    OBJECTIVE:   /88  Pulse 71  Temp 98  F (36.7  C) (Oral)  Ht 5' 10.87\" (1.8 m)  Wt 179 lb (81.2 kg)  SpO2 97%  BMI 25.06 kg/m2  EXAM:  GENERAL: healthy, alert and no distress  EYES: Eyes grossly normal to inspection, PERRL and conjunctivae and sclerae normal  HENT: ear canals and TM's normal, nose and mouth without ulcers or lesions  NECK: no adenopathy, no asymmetry, masses, or scars and thyroid normal to palpation  RESP: lungs clear to auscultation - no rales, rhonchi or wheezes  CV: regular rate and rhythm, normal S1 S2, no S3 or S4, no murmur, click or rub, no peripheral edema and peripheral pulses strong  ABDOMEN: soft, mild tenderness RUQ, no hepatosplenomegaly, no masses and bowel sounds normal   (male): normal male genitalia without lesions or urethral discharge, no hernia  MS: no gross musculoskeletal defects noted, no edema  SKIN: no suspicious lesions or rashes  NEURO: Normal strength and tone, mentation intact and speech normal  PSYCH: " mentation appears normal, affect normal/bright    Diagnostic Test Results:  Results for orders placed or performed in visit on 10/05/18   Albumin Random Urine Quantitative with Creat Ratio   Result Value Ref Range    Creatinine Urine 39 mg/dL    Albumin Urine mg/L 14 mg/L    Albumin Urine mg/g Cr 36.73 (H) 0 - 17 mg/g Cr   *UA reflex to Microscopic and Culture ([place] and [facility] (except [place] and [place])   Result Value Ref Range    Color Urine Yellow     Appearance Urine Clear     Glucose Urine Negative NEG^Negative mg/dL    Bilirubin Urine Negative NEG^Negative    Ketones Urine Negative NEG^Negative mg/dL    Specific Gravity Urine <=1.005 1.003 - 1.035    Blood Urine Trace (A) NEG^Negative    pH Urine 6.5 5.0 - 7.0 pH    Protein Albumin Urine Negative NEG^Negative mg/dL    Urobilinogen Urine 0.2 0.2 - 1.0 EU/dL    Nitrite Urine Negative NEG^Negative    Leukocyte Esterase Urine Negative NEG^Negative    Source Midstream Urine    Urine Microscopic   Result Value Ref Range    WBC Urine 0 - 5 OTO5^0 - 5 /HPF    RBC Urine 2-5 (A) OTO2^O - 2 /HPF    Bacteria Urine Few (A) NEG^Negative /HPF       ASSESSMENT/PLAN:       ICD-10-CM    1. Routine general medical examination at a health care facility Z00.00 Urine Microscopic   2. Microscopic hematuria R31.29 Albumin Random Urine Quantitative with Creat Ratio     *UA reflex to Microscopic and Culture ([place] and [facility] (except [place] and [place])     OFFICE/OUTPT VISIT,EST,LEVL IV     CANCELED: *UA reflex to Microscopic and Culture ([place]; [facility]; [facility]; [facility]; [facility]; [facility]; [place]; [place])   3. Proteinuria, unspecified type R80.9 OFFICE/OUTPT VISIT,EST,LEVL IV   4. HTN, goal below 140/90 I10 OFFICE/OUTPT VISIT,EST,LEVL IV   5. RUQ abdominal pain R10.11 US Abdomen Complete     OFFICE/OUTPT VISIT,EST,LEVL IV   6. Elevated bilirubin R17 US Abdomen Complete     OFFICE/OUTPT VISIT,EST,LEVL IV   micro  "hematuria continues, no etiology found, will work-up further. Bili up slightly and abd pain mild on exam, will include us and follow up as indicated  Avoid Nsaids, consider urology referral   Continue HTN med, monitor for side effects  Work on lifestyle, diet and more exercise, water intake and sleep    COUNSELING:  Reviewed preventive health counseling, as reflected in patient instructions    BP Readings from Last 1 Encounters:   10/05/18 138/88     Estimated body mass index is 25.06 kg/(m^2) as calculated from the following:    Height as of this encounter: 5' 10.87\" (1.8 m).    Weight as of this encounter: 179 lb (81.2 kg).           reports that he has never smoked. He has never used smokeless tobacco.      Counseling Resources:  ATP IV Guidelines  Pooled Cohorts Equation Calculator  FRAX Risk Assessment  ICSI Preventive Guidelines  Dietary Guidelines for Americans, 2010  USDA's MyPlate  ASA Prophylaxis  Lung CA Screening    FRANSISCO Michelle CNP  [facility]  "

## 2018-10-05 ENCOUNTER — TELEPHONE (OUTPATIENT)
Dept: FAMILY MEDICINE | Facility: CLINIC | Age: 43
End: 2018-10-05

## 2018-10-05 ENCOUNTER — OFFICE VISIT (OUTPATIENT)
Dept: FAMILY MEDICINE | Facility: CLINIC | Age: 43
End: 2018-10-05
Payer: COMMERCIAL

## 2018-10-05 VITALS
SYSTOLIC BLOOD PRESSURE: 138 MMHG | WEIGHT: 179 LBS | HEIGHT: 71 IN | OXYGEN SATURATION: 97 % | DIASTOLIC BLOOD PRESSURE: 88 MMHG | TEMPERATURE: 98 F | BODY MASS INDEX: 25.06 KG/M2 | HEART RATE: 71 BPM

## 2018-10-05 DIAGNOSIS — R17 ELEVATED BILIRUBIN: ICD-10-CM

## 2018-10-05 DIAGNOSIS — R80.9 PROTEINURIA, UNSPECIFIED TYPE: ICD-10-CM

## 2018-10-05 DIAGNOSIS — Z00.00 ROUTINE GENERAL MEDICAL EXAMINATION AT A HEALTH CARE FACILITY: Primary | ICD-10-CM

## 2018-10-05 DIAGNOSIS — R31.29 MICROSCOPIC HEMATURIA: ICD-10-CM

## 2018-10-05 DIAGNOSIS — R35.1 NOCTURIA: ICD-10-CM

## 2018-10-05 DIAGNOSIS — R31.29 MICROSCOPIC HEMATURIA: Primary | ICD-10-CM

## 2018-10-05 DIAGNOSIS — R10.11 RUQ ABDOMINAL PAIN: ICD-10-CM

## 2018-10-05 DIAGNOSIS — I10 HTN, GOAL BELOW 140/90: ICD-10-CM

## 2018-10-05 LAB
ALBUMIN UR-MCNC: NEGATIVE MG/DL
APPEARANCE UR: CLEAR
BACTERIA #/AREA URNS HPF: ABNORMAL /HPF
BILIRUB UR QL STRIP: NEGATIVE
COLOR UR AUTO: YELLOW
CREAT UR-MCNC: 39 MG/DL
GLUCOSE UR STRIP-MCNC: NEGATIVE MG/DL
HGB UR QL STRIP: ABNORMAL
KETONES UR STRIP-MCNC: NEGATIVE MG/DL
LEUKOCYTE ESTERASE UR QL STRIP: NEGATIVE
MICROALBUMIN UR-MCNC: 14 MG/L
MICROALBUMIN/CREAT UR: 36.73 MG/G CR (ref 0–17)
NITRATE UR QL: NEGATIVE
PH UR STRIP: 6.5 PH (ref 5–7)
RBC #/AREA URNS AUTO: ABNORMAL /HPF
SOURCE: ABNORMAL
SP GR UR STRIP: <=1.005 (ref 1–1.03)
UROBILINOGEN UR STRIP-ACNC: 0.2 EU/DL (ref 0.2–1)
WBC #/AREA URNS AUTO: ABNORMAL /HPF

## 2018-10-05 PROCEDURE — 99396 PREV VISIT EST AGE 40-64: CPT | Performed by: NURSE PRACTITIONER

## 2018-10-05 PROCEDURE — 81001 URINALYSIS AUTO W/SCOPE: CPT | Performed by: NURSE PRACTITIONER

## 2018-10-05 PROCEDURE — 99214 OFFICE O/P EST MOD 30 MIN: CPT | Mod: 25 | Performed by: NURSE PRACTITIONER

## 2018-10-05 PROCEDURE — 82043 UR ALBUMIN QUANTITATIVE: CPT | Performed by: NURSE PRACTITIONER

## 2018-10-05 NOTE — MR AVS SNAPSHOT
After Visit Summary   10/5/2018    Vasiliy Hay    MRN: [id number]           Patient Information     Date Of Birth          1975        Visit Information        Provider Department      10/5/2018 10:00 AM Leni Villanueva APRN [facility]        Today's Diagnoses     Routine general medical examination at a health care facility    -  1    Microscopic hematuria        Proteinuria, unspecified type        HTN, goal below 140/90        RUQ abdominal pain        Elevated bilirubin          Care Instructions      Preventive Health Recommendations  Male Ages 40 to 49    Yearly exam:             See your health care provider every year in order to  o   Review health changes.   o   Discuss preventive care.    o   Review your medicines if your doctor has prescribed any.    You should be tested each year for STDs (sexually transmitted diseases) if you re at risk.     Have a cholesterol test every 5 years.     Have a colonoscopy (test for colon cancer) if someone in your family has had colon cancer or polyps before age 50.     After age 45, have a diabetes test (fasting glucose). If you are at risk for diabetes, you should have this test every 3 years.      Talk with your health care provider about whether or not a prostate cancer screening test (PSA) is right for you.    Shots: Get a flu shot each year. Get a tetanus shot every 10 years.     Nutrition:    Eat at least 5 servings of fruits and vegetables daily.     Eat whole-grain bread, whole-wheat pasta and brown rice instead of white grains and rice.     Get adequate Calcium and Vitamin D.     Lifestyle    Exercise for at least 150 minutes a week (30 minutes a day, 5 days a week). This will help you control your weight and prevent disease.     Limit alcohol to one drink per day.     No smoking.     Wear sunscreen to prevent skin cancer.     See your dentist every six months for an exam and cleaning.              Follow-ups after  "your visit        Your next 10 appointments already scheduled     Oct 27, 2018 11:00 AM CDT   (Arrive by 10:45 AM)   New Patient Visit with Navarro Bell MD   [facility] ([facility] [place])    [address] [facility] [address] [phone]              Who to contact     If you have questions or need follow up information about today's clinic visit or your schedule please contact [facility] [place] at [phone].  Normal or non-critical lab and imaging results will be communicated to you by ShiftPlanninghart, letter or phone within 4 business days after the clinic has received the results. If you do not hear from us within 7 days, please contact the clinic through [employer] or phone. If you have a critical or abnormal lab result, we will notify you by phone as soon as possible.  Submit refill requests through [employer] or call your pharmacy and they will forward the refill request to us. Please allow 3 business days for your refill to be completed.          Additional Information About Your Visit        My[employer]TopPatch Information     [employer] gives you secure access to your electronic health record. If you see a primary care provider, you can also send messages to your care team and make appointments. If you have questions, please call your primary care clinic.  If you do not have a primary care provider, please call [phone] and they will assist you.        Care EveryWhere ID     This is your Care EveryWhere ID. This could be used by other organizations to access your [place] medical records  [id number]        Your Vitals Were     Pulse Temperature Height Pulse Oximetry BMI (Body Mass Index)       71 98  F (36.7  C) (Oral) 5' 10.87\" (1.8 m) 97% 25.06 kg/m2        Blood Pressure from Last 3 Encounters:   10/05/18 138/88   09/26/18 (!) 141/94   12/28/17 136/86    Weight from Last 3 Encounters:   10/05/18 179 lb (81.2 " kg)   09/26/18 175 lb (79.4 kg)   12/28/17 187 lb (84.8 kg)              We Performed the Following     *UA reflex to Microscopic and Culture ([place] and [facility] (except [place] and [place])     Albumin Random Urine Quantitative with Creat Ratio     OFFICE/OUTPT VISIT,EST,LEVL IV     Urine Microscopic        Primary Care Provider Office Phone # Fax #    David Zafar [phone]       [address] [facility] [address]  [place] [address]        Equal Access to Services     [facility]: Hadii aad ku hadasho Soomaali, waaxda luqadaha, qaybta kaalmada adeegyada, waxay mikaela bronson . So [facility] [phone].    ATENCIÓN: Si habla español, tiene a snow disposición servicios gratuitos de asistencia lingüística. [facility] [phone].    We comply with applicable federal civil rights laws and [place] laws. We do not discriminate on the basis of race, color, national origin, age, disability, sex, sexual orientation, or gender identity.            Thank you!     Thank you for choosing [facility]  for your care. Our goal is always to provide you with excellent care. Hearing back from our patients is one way we can continue to improve our services. Please take a few minutes to complete the written survey that you may receive in the mail after your visit with us. Thank you!             Your Updated Medication List - Protect others around you: Learn how to safely use, store and throw away your medicines at www.disposemymeds.org.          This list is accurate as of 10/5/18 11:59 PM.  Always use your most recent med list.                   Brand Name Dispense Instructions for use Diagnosis    albuterol (2.5 MG/3ML) 0.083% neb solution     25 vial    Take 1 vial (2.5 mg) by nebulization every 4 hours as needed for shortness of breath / dyspnea or wheezing    Wheezing, Cough       amLODIPine 5 MG tablet    NORVASC    90 tablet    Take 1 tablet (5 mg) by mouth  daily    HTN, goal below 140/90       doxycycline 100 MG tablet    VIBRA-TABS    14 tablet    Take 1 tablet (100 mg) by mouth 2 times daily    Pneumonia due to infectious organism, unspecified laterality, unspecified part of lung       guaiFENesin-codeine 100-10 MG/5ML Soln solution    ROBITUSSIN AC    120 mL    Take 5 mLs by mouth every 4 hours as needed for cough    Cough       TUMS PO      Take  by mouth as needed.

## 2018-10-10 NOTE — TELEPHONE ENCOUNTER
Leni Mustafa is fine with the [facility] urology clinic    Malena Quinones RN   [facility]

## 2018-10-12 NOTE — TELEPHONE ENCOUNTER
Spoke with pt  Referral info given    P: [place] [facility] - [place] [phone]       Malena Quinones RN   [facility]

## 2018-10-16 ENCOUNTER — PRE VISIT (OUTPATIENT)
Dept: UROLOGY | Facility: CLINIC | Age: 43
End: 2018-10-16

## 2018-10-16 NOTE — TELEPHONE ENCOUNTER
MEDICAL RECORDS REQUEST   [place] [facility]  Urology Clinic  [address] [place] [address]  PHONE: [phone]  Fax: [phone]        FUTURE VISIT INFORMATION                                                   Vasiliy Hay, : 1975 scheduled for future visit at [facility]    APPOINTMENT INFORMATION:    Date: 10/27/2018    Provider:  Navarro Bell    Reason for Visit/Diagnosis: Nocturia and hematuria    REFERRAL INFORMATION:    Referring provider:  David Zafar    Specialty: MD    Referring providers clinic:  [facility]    Clinic contact number:[phone];      RECORDS REQUESTED FOR VISIT                                                     NOTES  STATUS/DETAILS   OFFICE NOTE from referring provider  yes   OFFICE NOTE from other specialist  no   DISCHARGE SUMMARY from hospital  no   DISCHARGE REPORT from the ER  no   OPERATIVE REPORT  no   MEDICATION LIST  yes       PRE-VISIT CHECKLIST      Record collection complete Yes   Appointment appropriately scheduled           (right time/right provider) Yes   MyChart activation Yes   Questionnaire complete If no, please explain in process     Completed by: Sandi Martinez

## 2018-10-18 ENCOUNTER — PRE VISIT (OUTPATIENT)
Dept: UROLOGY | Facility: CLINIC | Age: 43
End: 2018-10-18

## 2018-10-18 NOTE — TELEPHONE ENCOUNTER
Patient with history of nocturia and hematuria coming in for consult. Patient chart reviewed, no need for call, all records available and ready for appointment.

## 2018-10-27 ENCOUNTER — OFFICE VISIT (OUTPATIENT)
Dept: UROLOGY | Facility: CLINIC | Age: 43
End: 2018-10-27
Payer: COMMERCIAL

## 2018-10-27 VITALS
BODY MASS INDEX: 24.88 KG/M2 | HEIGHT: 71 IN | SYSTOLIC BLOOD PRESSURE: 153 MMHG | HEART RATE: 87 BPM | DIASTOLIC BLOOD PRESSURE: 93 MMHG | WEIGHT: 177.7 LBS

## 2018-10-27 DIAGNOSIS — R35.1 NOCTURIA: Primary | ICD-10-CM

## 2018-10-27 ASSESSMENT — PAIN SCALES - GENERAL: PAINLEVEL: NO PAIN (0)

## 2018-10-27 NOTE — PROGRESS NOTES
Name: Vasiliy Hay    MRN: [id number]   YOB: 1975                 Chief Complaint:   nocturia          History of Present Illness:   Mr. Vasiliy Hay is a 43 year old male seen in consultation today  Nocturia 3-4 times/night. Sometimes even 5 times/night. Makes up every  minutes.    During the day, no complaints. He feels he has a good stream. No incomplete emptying.  No dysuria. Has tried to stop drinking water prior to bed.    Denies gross hematuria.    Nonsmoker.  UA on 9/26/2018 demonstrated 0-2 RBC/HPF (not microscopic hematuria)  Repeat UA showed 2-5 RBC/HPF (microhematuria)         Past Medical History:     Past Medical History:   Diagnosis Date     CARDIOVASCULAR SCREENING; LDL GOAL LESS THAN 130      Hypertension      Positive TB test             Past Surgical History:     Past Surgical History:   Procedure Laterality Date     ORTHOPEDIC SURGERY      L finger s/p injury            Social History:     Social History   Substance Use Topics     Smoking status: Never Smoker     Smokeless tobacco: Never Used     Alcohol use No            Family History:   History reviewed. No pertinent family history.           Allergies:   No Known Allergies         Medications:     Current Outpatient Prescriptions   Medication Sig     albuterol (2.5 MG/3ML) 0.083% neb solution Take 1 vial (2.5 mg) by nebulization every 4 hours as needed for shortness of breath / dyspnea or wheezing     amLODIPine (NORVASC) 5 MG tablet Take 1 tablet (5 mg) by mouth daily     Calcium Carbonate Antacid (TUMS PO) Take  by mouth as needed.     doxycycline (VIBRA-TABS) 100 MG tablet Take 1 tablet (100 mg) by mouth 2 times daily     guaiFENesin-codeine (ROBITUSSIN AC) 100-10 MG/5ML SOLN solution Take 5 mLs by mouth every 4 hours as needed for cough     [DISCONTINUED] hydrochlorothiazide (HYDRODIURIL) 25 MG tablet Take 1 tablet (25 mg) by mouth daily     No current facility-administered medications for this visit.   "            Review of Systems:    ROS: 14 point ROS neg other than the symptoms noted above in the HPI and PMH.          Physical Exam:   B/P: 153/93, T: Data Unavailable, P: 87, R: Data Unavailable  Estimated body mass index is 24.88 kg/(m^2) as calculated from the following:    Height as of this encounter: 1.8 m (5' 10.87\").    Weight as of this encounter: 80.6 kg (177 lb 11.2 oz).  General: age-appropriate appearing male in NAD. thin body habitus.  HEENT: Head AT/NC, EOMI, CN Grossly intact  Resp: no respiratory distress, lung sounds clear.  CV: heart rate regular, S1, S2.  Lymph: No cervical, supraclavicular or axillary lymphadenopathy  Back: bony spine is non-tender, flanks are nontender  Abdomen: no obesity , soft, non-distended, non-tender. No organomegaly.   : testicles normal without atrophy or masses and penis normal without urethral discharge  LE: no edema.   Neuro: grossly intact  Motor: excellent strength throughout  Skin: clear of rashes or ecchymoses.     Urinary Flow Rate  Peak Flow: 5.1 mL/s  Average Flow: 2.5 mL/s  Voided (mL): 42 mL  Residual Volume by Ultrasound: 0 mL  Character of Curve: Intermittent             Assessment and Plan:   43 year old male with borderline microhematuria.  Also has unusual nocturia.  PVR is 0 today  He has an ultrasound ordered but not completed.  I recommend he has a cystoscopy.  If negative, then would try nightly oxybutynin.    Navarro eBll MD  [facility] Urology         "

## 2018-10-27 NOTE — NURSING NOTE
"Chief Complaint   Patient presents with     Consult     nocturia       Blood pressure (!) 153/93, pulse 87, height 1.8 m (5' 10.87\"), weight 80.6 kg (177 lb 11.2 oz). Body mass index is 24.88 kg/(m^2).    Patient Active Problem List   Diagnosis     Positive TB test     CARDIOVASCULAR SCREENING; LDL GOAL LESS THAN 130     HTN, goal below 140/90     Abnormal laboratory test result       No Known Allergies    Current Outpatient Prescriptions   Medication Sig Dispense Refill     albuterol (2.5 MG/3ML) 0.083% neb solution Take 1 vial (2.5 mg) by nebulization every 4 hours as needed for shortness of breath / dyspnea or wheezing 25 vial 0     amLODIPine (NORVASC) 5 MG tablet Take 1 tablet (5 mg) by mouth daily 90 tablet 2     Calcium Carbonate Antacid (TUMS PO) Take  by mouth as needed.       doxycycline (VIBRA-TABS) 100 MG tablet Take 1 tablet (100 mg) by mouth 2 times daily 14 tablet 0     guaiFENesin-codeine (ROBITUSSIN AC) 100-10 MG/5ML SOLN solution Take 5 mLs by mouth every 4 hours as needed for cough 120 mL 0     [DISCONTINUED] hydrochlorothiazide (HYDRODIURIL) 25 MG tablet Take 1 tablet (25 mg) by mouth daily 90 tablet 3       Social History   Substance Use Topics     Smoking status: Never Smoker     Smokeless tobacco: Never Used     Alcohol use No       Farzana Lynch LPN  10/27/2018  10:27 AM    "

## 2018-10-27 NOTE — LETTER
10/27/2018       RE: Vasiliy Hay  [address]  [facility] [address]     Dear Colleague,    Thank you for referring your patient, Vasiliy Hay, to the [facility] UROLOGY AND INST FOR PROSTATE AND UROLOGIC CANCERS at [facility]. Please see a copy of my visit note below.      Name: Vasiliy Hay    MRN: [id number]   YOB: 1975                 Chief Complaint:   nocturia          History of Present Illness:   Mr. Vasiliy Hay is a 43 year old male seen in consultation today  Nocturia 3-4 times/night. Sometimes even 5 times/night. Makes up every  minutes.    During the day, no complaints. He feels he has a good stream. No incomplete emptying.  No dysuria. Has tried to stop drinking water prior to bed.    Denies gross hematuria.    Nonsmoker.  UA on 9/26/2018 demonstrated 0-2 RBC/HPF (not microscopic hematuria)  Repeat UA showed 2-5 RBC/HPF (microhematuria)         Past Medical History:     Past Medical History:   Diagnosis Date     CARDIOVASCULAR SCREENING; LDL GOAL LESS THAN 130      Hypertension      Positive TB test             Past Surgical History:     Past Surgical History:   Procedure Laterality Date     ORTHOPEDIC SURGERY      L finger s/p injury            Social History:     Social History   Substance Use Topics     Smoking status: Never Smoker     Smokeless tobacco: Never Used     Alcohol use No            Family History:   History reviewed. No pertinent family history.           Allergies:   No Known Allergies         Medications:     Current Outpatient Prescriptions   Medication Sig     albuterol (2.5 MG/3ML) 0.083% neb solution Take 1 vial (2.5 mg) by nebulization every 4 hours as needed for shortness of breath / dyspnea or wheezing     amLODIPine (NORVASC) 5 MG tablet Take 1 tablet (5 mg) by mouth daily     Calcium Carbonate Antacid (TUMS PO) Take  by mouth as needed.     doxycycline (VIBRA-TABS) 100 MG  "tablet Take 1 tablet (100 mg) by mouth 2 times daily     guaiFENesin-codeine (ROBITUSSIN AC) 100-10 MG/5ML SOLN solution Take 5 mLs by mouth every 4 hours as needed for cough     [DISCONTINUED] hydrochlorothiazide (HYDRODIURIL) 25 MG tablet Take 1 tablet (25 mg) by mouth daily     No current facility-administered medications for this visit.           Physical Exam:   B/P: 153/93, T: Data Unavailable, P: 87, R: Data Unavailable  Estimated body mass index is 24.88 kg/(m^2) as calculated from the following:    Height as of this encounter: 1.8 m (5' 10.87\").    Weight as of this encounter: 80.6 kg (177 lb 11.2 oz).  General: age-appropriate appearing male in NAD. thin body habitus.  HEENT: Head AT/NC, EOMI, CN Grossly intact  Resp: no respiratory distress, lung sounds clear.  CV: heart rate regular, S1, S2.  Lymph: No cervical, supraclavicular or axillary lymphadenopathy  Back: bony spine is non-tender, flanks are nontender  Abdomen: no obesity , soft, non-distended, non-tender. No organomegaly.   : testicles normal without atrophy or masses and penis normal without urethral discharge  LE: no edema.   Neuro: grossly intact  Motor: excellent strength throughout  Skin: clear of rashes or ecchymoses.     Urinary Flow Rate  Peak Flow: 5.1 mL/s  Average Flow: 2.5 mL/s  Voided (mL): 42 mL  Residual Volume by Ultrasound: 0 mL  Character of Curve: Intermittent             Assessment and Plan:   43 year old male with borderline microhematuria.  Also has unusual nocturia.  PVR is 0 today  He has an ultrasound ordered but not completed.  I recommend he has a cystoscopy.  If negative, then would try nightly oxybutynin.    Navarro Bell MD  Reconstructive Urology         "

## 2018-10-27 NOTE — MR AVS SNAPSHOT
After Visit Summary   10/27/2018    Vasiliy Hay    MRN: [id number]           Patient Information     Date Of Birth          1975        Visit Information        Provider Department      10/27/2018 11:00 AM Navarro Bell MD [facility] Urology and [facility] for Prostate and Urologic Cancers        Care Instructions    Schedule ultrasound and follow up with Dr. Bell for cystoscopy.      It was a pleasure meeting with you today.  Thank you for allowing me and my team the privilege of caring for you today.  YOU are the reason we are here, and I truly hope we provided you with the excellent service you deserve.  Please let us know if there is anything else we can do for you so that we can be sure you are leaving completely satisfied with your care experience.                  Follow-ups after your visit        Who to contact     Please call your clinic at [phone] to:    Ask questions about your health    Make or cancel appointments    Discuss your medicines    Learn about your test results    Speak to your doctor            Additional Information About Your Visit        Twenty[employer] Information     HomeRun gives you secure access to your electronic health record. If you see a primary care provider, you can also send messages to your care team and make appointments. If you have questions, please call your primary care clinic.  If you do not have a primary care provider, please call [phone] and they will assist you.      HomeRun is an electronic gateway that provides easy, online access to your medical records. With HomeRun, you can request a clinic appointment, read your test results, renew a prescription or communicate with your care team.     To access your existing account, please contact your [facility] or call [phone] for assistance.        Care EveryWhere ID     This is your Care EveryWhere ID. This could be used by other organizations to access your  "[facility] medical records  [id number]        Your Vitals Were     Pulse Height BMI (Body Mass Index)             87 1.8 m (5' 10.87\") 24.88 kg/m2          Blood Pressure from Last 3 Encounters:   10/27/18 (!) 153/93   10/05/18 138/88   09/26/18 (!) 141/94    Weight from Last 3 Encounters:   10/27/18 80.6 kg (177 lb 11.2 oz)   10/05/18 81.2 kg (179 lb)   09/26/18 79.4 kg (175 lb)              Today, you had the following     No orders found for display       Primary Care Provider Office Phone # Fax #    David Zafar [phone]       [address] [address]        Equal Access to Services     [facility]: Hadii nettie peck Sojessica, waaxda luqadaha, qaybta kaalmada joni, tori bronson . So [facility] [phone].    ATENCIÓN: Si habla español, tiene a snow disposición servicios gratuitos de asistencia lingüística. Erasmo al [phone].    We comply with applicable federal civil rights laws and [place] laws. We do not discriminate on the basis of race, color, national origin, age, disability, sex, sexual orientation, or gender identity.            Thank you!     Thank you for choosing [facility] UROLOGY AND [facility] FOR PROSTATE AND UROLOGIC CANCERS  for your care. Our goal is always to provide you with excellent care. Hearing back from our patients is one way we can continue to improve our services. Please take a few minutes to complete the written survey that you may receive in the mail after your visit with us. Thank you!             Your Updated Medication List - Protect others around you: Learn how to safely use, store and throw away your medicines at www.disposemymeds.org.          This list is accurate as of 10/27/18 11:08 AM.  Always use your most recent med list.                   Brand Name Dispense Instructions for use Diagnosis    albuterol (2.5 MG/3ML) 0.083% neb solution     25 vial    Take 1 vial (2.5 mg) by nebulization " every 4 hours as needed for shortness of breath / dyspnea or wheezing    Wheezing, Cough       amLODIPine 5 MG tablet    NORVASC    90 tablet    Take 1 tablet (5 mg) by mouth daily    HTN, goal below 140/90       doxycycline 100 MG tablet    VIBRA-TABS    14 tablet    Take 1 tablet (100 mg) by mouth 2 times daily    Pneumonia due to infectious organism, unspecified laterality, unspecified part of lung       guaiFENesin-codeine 100-10 MG/5ML Soln solution    ROBITUSSIN AC    120 mL    Take 5 mLs by mouth every 4 hours as needed for cough    Cough       TUMS PO      Take  by mouth as needed.

## 2018-10-27 NOTE — PATIENT INSTRUCTIONS
Schedule ultrasound and follow up with Dr. Bell for cystoscopy.      It was a pleasure meeting with you today.  Thank you for allowing me and my team the privilege of caring for you today.  YOU are the reason we are here, and I truly hope we provided you with the excellent service you deserve.  Please let us know if there is anything else we can do for you so that we can be sure you are leaving completely satisfied with your care experience.

## 2018-11-27 ENCOUNTER — RADIANT APPOINTMENT (OUTPATIENT)
Dept: ULTRASOUND IMAGING | Facility: CLINIC | Age: 43
End: 2018-11-27
Attending: NURSE PRACTITIONER
Payer: COMMERCIAL

## 2018-11-27 DIAGNOSIS — R10.11 RUQ ABDOMINAL PAIN: ICD-10-CM

## 2018-11-27 DIAGNOSIS — R17 ELEVATED BILIRUBIN: ICD-10-CM

## 2019-01-18 DIAGNOSIS — I10 HTN, GOAL BELOW 140/90: ICD-10-CM

## 2019-01-18 RX ORDER — AMLODIPINE BESYLATE 5 MG/1
5 TABLET ORAL DAILY
Qty: 90 TABLET | Refills: 3 | Status: SHIPPED | OUTPATIENT
Start: 2019-01-18 | End: 2021-07-26

## 2019-01-18 NOTE — TELEPHONE ENCOUNTER
"Requested Prescriptions   Pending Prescriptions Disp Refills     amLODIPine (NORVASC) 5 MG tablet 90 tablet 2    Last Written Prescription Date:  03/19/2018  Last Fill Quantity: 90,  # refills: 2   Last office visit: 10/5/2018 with prescribing provider:  10/05/2018   Future Office Visit:   Sig: Take 1 tablet (5 mg) by mouth daily    Calcium Channel Blockers Protocol  Failed - 1/18/2019  9:11 AM       Failed - Blood pressure under 140/90 in past 12 months    BP Readings from Last 3 Encounters:   10/27/18 (!) 153/93   10/05/18 138/88   09/26/18 (!) 141/94                Passed - Recent (12 mo) or future (30 days) visit within the authorizing provider's specialty    Patient had office visit in the last 12 months or has a visit in the next 30 days with authorizing provider or within the authorizing provider's specialty.  See \"Patient Info\" tab in inbasket, or \"Choose Columns\" in Meds & Orders section of the refill encounter.             Passed - Medication is active on med list       Passed - Patient is age 18 or older       Passed - Normal serum creatinine on file in past 12 months    Recent Labs   Lab Test 09/26/18  1102   CR 0.70             "

## 2019-07-17 ENCOUNTER — TELEPHONE (OUTPATIENT)
Dept: FAMILY MEDICINE | Facility: CLINIC | Age: 44
End: 2019-07-17

## 2019-07-17 NOTE — TELEPHONE ENCOUNTER
Panel Management Review      Patient has the following on his problem list:     Hypertension   Last three blood pressure readings:  BP Readings from Last 3 Encounters:   10/27/18 (!) 153/93   10/05/18 138/88   09/26/18 (!) 141/94     Blood pressure: FAILED    HTN Guidelines:  Less than 140/90      Composite cancer screening  Chart review shows that this patient is due/due soon for the following None  Summary:    Patient is due/failing the following:   BP CHECK and LDL    Action needed:   Patient needs office visit for Hypertension.    Type of outreach:    Sent letter.    Questions for provider review:    None                                                                                                                                    Farzana Silva MA

## 2019-07-17 NOTE — LETTER
[address]  [facility] [address]  Phone: [phone]  Fax: [phone]              July 17, 2019      Vasiliy Hay  [address] [place] [address]        Dear Vasiliy,    I care about your health and have reviewed your health plan. I have reviewed your medical conditions, medication list, and lab results and am making recommendations based on this review, to better manage your health.    You are in particular need of attention regarding:  -Cholesterol  -High Blood Pressure    I am recommending that you:  -schedule a FOLLOWUP OFFICE APPOINTMENT with me.  I will recheck your: Lipids (fasting cholesterol - nothing to eat except water and/or meds for 8+ hours) test.      Here is a list of Health Maintenance topics that are due now or due soon:  Health Maintenance Due   Topic Date Due     Diptheria Tetanus Pertussis (DTAP/TDAP/TD) Vaccine (1 - Tdap) 01/29/2001     PHQ-2  01/01/2019     Cholesterol Lab  06/04/2019       Please call us at [phone] (or use Fan[employer]) to address the above recommendations.     Thank you for trusting [facility] and we appreciate the opportunity to serve you.  We look forward to supporting your healthcare needs in the future.    Healthy Regards,    Leni Villanueva NP

## 2020-03-01 ENCOUNTER — HEALTH MAINTENANCE LETTER (OUTPATIENT)
Age: 45
End: 2020-03-01

## 2020-12-14 ENCOUNTER — HEALTH MAINTENANCE LETTER (OUTPATIENT)
Age: 45
End: 2020-12-14

## 2021-04-16 ENCOUNTER — OFFICE VISIT - HEALTHEAST (OUTPATIENT)
Dept: FAMILY MEDICINE | Facility: CLINIC | Age: 46
End: 2021-04-16

## 2021-04-16 DIAGNOSIS — Z12.5 SCREENING FOR PROSTATE CANCER: ICD-10-CM

## 2021-04-16 DIAGNOSIS — Z00.00 ANNUAL PHYSICAL EXAM: ICD-10-CM

## 2021-04-16 DIAGNOSIS — I10 BENIGN ESSENTIAL HYPERTENSION: ICD-10-CM

## 2021-04-16 DIAGNOSIS — Z11.59 ENCOUNTER FOR HEPATITIS C SCREENING TEST FOR LOW RISK PATIENT: ICD-10-CM

## 2021-04-16 DIAGNOSIS — Z13.220 LIPID SCREENING: ICD-10-CM

## 2021-04-16 DIAGNOSIS — Z23 NEED FOR DIPHTHERIA-TETANUS-PERTUSSIS (TDAP) VACCINE: ICD-10-CM

## 2021-04-16 RX ORDER — LOSARTAN POTASSIUM 100 MG/1
100 TABLET ORAL DAILY
Qty: 90 TABLET | Refills: 0 | Status: SHIPPED | OUTPATIENT
Start: 2021-04-16 | End: 2021-07-23

## 2021-04-16 RX ORDER — CALCIUM CARBONATE 500 MG/1
TABLET, CHEWABLE ORAL
Status: SHIPPED | COMMUNITY
Start: 2021-04-16

## 2021-04-16 ASSESSMENT — MIFFLIN-ST. JEOR: SCORE: 1725.87

## 2021-04-17 ENCOUNTER — HEALTH MAINTENANCE LETTER (OUTPATIENT)
Age: 46
End: 2021-04-17

## 2021-06-05 VITALS
OXYGEN SATURATION: 98 % | WEIGHT: 184 LBS | BODY MASS INDEX: 26.34 KG/M2 | SYSTOLIC BLOOD PRESSURE: 136 MMHG | DIASTOLIC BLOOD PRESSURE: 90 MMHG | HEIGHT: 70 IN | HEART RATE: 65 BPM

## 2021-06-16 PROBLEM — R76.11 POSITIVE TB TEST: Status: ACTIVE | Noted: 2021-04-18

## 2021-06-16 NOTE — PROGRESS NOTES
Check bp one more time.   Assessment:      Healthy male exam.      Plan:      1. Annual physical exam     2. Benign essential hypertension  losartan (COZAAR) 100 MG tablet    Basic Metabolic Panel   3. Need for diphtheria-tetanus-pertussis (Tdap) vaccine     4. Screening for prostate cancer  PSA, Annual Screen (Prostatic-Specific Antigen)   5. Encounter for hepatitis C screening test for low risk patient  Hepatitis C Antibody (Anti-HCV)   6. Lipid screening  Lipid Cascade FASTING     Blood pressure today is borderline.  Continue same losartan.  He has to leave so we are unable to collect labs today but I encouraged him to schedule a lab only appointment next week.  Also get blood pressure checked at that time.  If elevated, add on hydrochlorothiazide.    Subjective:      Vasiliy Hay is a 46 y.o. male who presents for an annual exam. The patient reports that there is not domestic violence in his life.     Overall patient is doing well.  Here to establish care from [place].  Currently on losartan 100 mg for hypertension management.  Previously was on amlodipine but had significant swelling problems.  Has children at home.  It sounds like he may have had some BPH symptoms and has seen a urologist before in the past.  Flomax was recommended, but it does not sound like that has happened.  Overdue for tetanus.    Healthy Habits:   Regular Exercise: No  Sunscreen Use: No  Healthy Diet: Yes  Dental Visits Regularly: No  Seat Belt: Yes  Sexually active: Yes  Monthly Self Testicular Exams:  No  Hemoccults: N/A  Flex Sig: N/A  Colonoscopy: N/A  Lipid Profile: Yes  Glucose Screen: Yes  Prevention of Osteoporosis: N/A  Last Dexa: N/A  Guns at Home:  No      Immunization History   Administered Date(s) Administered     COVID-19,PF,Pfizer 01/08/2021, 01/29/2021     INFLUENZA,SEASONAL QUAD, PF, =/> 6months 10/09/2019     Immunization status: stated as current, but no records available.    No exam data present    Current  Outpatient Medications   Medication Sig Dispense Refill     calcium, as carbonate, (TUMS) 200 mg calcium (500 mg) chewable tablet Chew.       losartan (COZAAR) 100 MG tablet Take 100 mg by mouth daily.       No current facility-administered medications for this visit.      No past medical history on file.  No past surgical history on file.  Patient has no known allergies.  No family history on file.  Social History     Socioeconomic History     Marital status:      Spouse name: Not on file     Number of children: Not on file     Years of education: Not on file     Highest education level: Not on file   Occupational History     Not on file   Social Needs     Financial resource strain: Not on file     Food insecurity     Worry: Not on file     Inability: Not on file     Transportation needs     Medical: Not on file     Non-medical: Not on file   Tobacco Use     Smoking status: Never Smoker     Smokeless tobacco: Never Used   Substance and Sexual Activity     Alcohol use: Not on file     Drug use: Not on file     Sexual activity: Not on file   Lifestyle     Physical activity     Days per week: Not on file     Minutes per session: Not on file     Stress: Not on file   Relationships     Social connections     Talks on phone: Not on file     Gets together: Not on file     Attends Mandaeism service: Not on file     Active member of club or organization: Not on file     Attends meetings of clubs or organizations: Not on file     Relationship status: Not on file     Intimate partner violence     Fear of current or ex partner: Not on file     Emotionally abused: Not on file     Physically abused: Not on file     Forced sexual activity: Not on file   Other Topics Concern     Not on file   Social History Narrative     Not on file       Review of Systems  General:  Denies problem  Eyes: Denies problem  Ears/Nose/Throat: Denies problem  Cardiovascular: Denies problem  Respiratory:  Denies problem  Gastrointestinal:  Denies  "problem  Genitourinary: Denies problem  Musculoskeletal:  Denies problem  Skin: Denies problem  Neurologic: Denies problem  Psychiatric: Denies problem  Endocrine: Denies problem  Heme/Lymphatic: Denies problem   Allergic/Immunologic: Denies problem        Objective:     Vitals:    04/16/21 1603 04/16/21 1607   BP: (!) 146/94 136/90   Pulse: 65    SpO2: 98%    Weight: 184 lb (83.5 kg)    Height: 5' 10\" (1.778 m)      Body mass index is 26.4 kg/m .    Physical  General Appearance: Alert, cooperative, no distress, appears stated age  Head: Normocephalic, without obvious abnormality, atraumatic  Eyes: PERRL, conjunctiva/corneas clear, EOM's intact  Ears: Normal TM's and external ear canals, both ears  Nose: Nares normal, septum midline,mucosa normal, no drainage  Throat: Lips, mucosa, and tongue normal; teeth and gums normal  Neck: Supple, symmetrical, trachea midline, no adenopathy;  thyroid: not enlarged, symmetric, no tenderness/mass/nodules; no carotid bruit or JVD  Back: Symmetric, no curvature, ROM normal, no CVA tenderness  Lungs: Clear to auscultation bilaterally, respirations unlabored  Heart: Regular rate and rhythm, S1 and S2 normal, no murmur, rub, or gallop,  Abdomen: Soft, non-tender, bowel sounds active all four quadrants,  no masses, no organomegaly  Musculoskeletal: Normal range of motion. No joint swelling or deformity.   Extremities: Extremities normal, atraumatic, no cyanosis or edema  Skin: Skin color, texture, turgor normal, no rashes or lesions  Lymph nodes: Cervical, supraclavicular, and axillary nodes normal  Neurologic: He is alert. He has normal reflexes.   Psychiatric: He has a normal mood and affect.      Carlos Hines CNP      "

## 2021-06-16 NOTE — PATIENT INSTRUCTIONS - HE
Come back next week for the blood tests and the tetanus shot.  Let's have the nurses check your blood pressure at that time too.     I did get a refill of the losartan to your pharmacy to continue.

## 2021-07-16 DIAGNOSIS — I10 BENIGN ESSENTIAL HYPERTENSION: ICD-10-CM

## 2021-07-20 DIAGNOSIS — I10 HTN, GOAL BELOW 140/90: Primary | ICD-10-CM

## 2021-07-20 RX ORDER — LOSARTAN POTASSIUM 100 MG/1
100 TABLET ORAL
COMMUNITY
Start: 2020-08-13 | End: 2021-07-20

## 2021-07-21 NOTE — TELEPHONE ENCOUNTER
No number on file for pt.     Left message for pt's spouse for pt to call back.     Left message to call back for: Pt.   Information to relay to patient: Information below.    Pari Smalls, [facility]

## 2021-07-21 NOTE — TELEPHONE ENCOUNTER
"Routing refill request to provider for review/approval because:  Labs not current:  Multiple     Last Written Prescription Date:  4/16/21  Last Fill Quantity: 90,  # refills: 0   Last office visit provider:  4/16/21     Requested Prescriptions   Pending Prescriptions Disp Refills     losartan (COZAAR) 100 MG tablet [Pharmacy Med Name: LOSARTAN 100MG TABLETS] 90 tablet 0     Sig: TAKE 1 TABLET(100 MG) BY MOUTH DAILY       Angiotensin-II Receptors Failed - 7/16/2021  9:26 AM        Failed - Last blood pressure under 140/90 in past 12 months     BP Readings from Last 3 Encounters:   04/16/21 (!) 136/90                 Failed - Normal serum creatinine on file in past 12 months     No lab results found.    Ok to refill medication if creatinine is low          Failed - Normal serum potassium on file in past 12 months     No lab results found.                 Passed - Recent (12 mo) or future (30 days) visit within the authorizing provider's specialty     Patient has had an office visit with the authorizing provider or a provider within the authorizing providers department within the previous 12 mos or has a future within next 30 days. See \"Patient Info\" tab in inbasket, or \"Choose Columns\" in Meds & Orders section of the refill encounter.              Passed - Medication is active on med list        Passed - Patient is age 18 or older             Ambrosio Mcgrath RN 07/21/21 1:23 PM  "

## 2021-07-21 NOTE — TELEPHONE ENCOUNTER
Please call the patient.  He was supposed to come back a week after his visit to get labs done.  These were future ordered.  Please schedule a lab only appointment if he wants to continue getting losartan.    Carlos Hines, CNP

## 2021-07-22 NOTE — TELEPHONE ENCOUNTER
FUTURE VISIT INFORMATION      FUTURE VISIT INFORMATION:    Date: 7/26/21    Time: 10:20am    [place] [facility]  [place]ERRAL INFORMATION:    Referring provider:  self    Referring providers clinic:  N/A    Reason for visit/diagnosis  Annual eye exam    RECORDS REQUESTED FROM:       No recs to collect

## 2021-07-23 ENCOUNTER — TELEPHONE (OUTPATIENT)
Dept: FAMILY MEDICINE | Facility: CLINIC | Age: 46
End: 2021-07-23

## 2021-07-23 DIAGNOSIS — I10 HTN, GOAL BELOW 140/90: Primary | ICD-10-CM

## 2021-07-23 DIAGNOSIS — Z12.5 SCREENING FOR PROSTATE CANCER: ICD-10-CM

## 2021-07-23 DIAGNOSIS — Z13.220 LIPID SCREENING: ICD-10-CM

## 2021-07-23 RX ORDER — LOSARTAN POTASSIUM 100 MG/1
TABLET ORAL
Qty: 30 TABLET | Refills: 0 | Status: SHIPPED | OUTPATIENT
Start: 2021-07-23 | End: 2021-08-24

## 2021-07-23 NOTE — PROGRESS NOTES
patient on lab schedule for monday 7/27/21 and no orders are in the chart, please advise or put in orders.  Thanks

## 2021-07-25 RX ORDER — LOSARTAN POTASSIUM 100 MG/1
100 TABLET ORAL DAILY
Qty: 30 TABLET | Refills: 0 | Status: SHIPPED | OUTPATIENT
Start: 2021-07-25 | End: 2021-09-22

## 2021-07-25 NOTE — TELEPHONE ENCOUNTER
"Routing refill request to provider for review/approval because:  Labs and blood pressure out of range      Requested Prescriptions   Pending Prescriptions Disp Refills     losartan (COZAAR) 100 MG tablet       Sig: Take 1 tablet (100 mg) by mouth       Angiotensin-II Receptors Failed - 7/20/2021 10:59 AM        Failed - Last blood pressure under 140/90 in past 12 months     BP Readings from Last 3 Encounters:   10/27/18 (!) 153/93   10/05/18 138/88   09/26/18 (!) 141/94                 Failed - Recent (12 mo) or future (30 days) visit within the authorizing provider's specialty     Patient has had an office visit with the authorizing provider or a provider within the authorizing providers department within the previous 12 mos or has a future within next 30 days. See \"Patient Info\" tab in inbasket, or \"Choose Columns\" in Meds & Orders section of the refill encounter.              Failed - Normal serum creatinine on file in past 12 months     Recent Labs   Lab Test 09/26/18  1102   CR 0.70       Ok to refill medication if creatinine is low          Failed - Normal serum potassium on file in past 12 months     Recent Labs   Lab Test 09/26/18  1102   POTASSIUM 3.8                    Passed - Medication is active on med list        Passed - Patient is age 18 or older         Signed Prescriptions Disp Refills    losartan (COZAAR) 100 MG tablet       Sig: Take 100 mg by mouth       There is no refill protocol information for this order          Sydnie Salas RN 07/24/21 10:52 PM  "

## 2021-07-26 ENCOUNTER — LAB (OUTPATIENT)
Dept: LAB | Facility: CLINIC | Age: 46
End: 2021-07-26

## 2021-07-26 ENCOUNTER — PRE VISIT (OUTPATIENT)
Dept: OPHTHALMOLOGY | Facility: CLINIC | Age: 46
End: 2021-07-26

## 2021-07-26 ENCOUNTER — OFFICE VISIT (OUTPATIENT)
Dept: OPHTHALMOLOGY | Facility: CLINIC | Age: 46
End: 2021-07-26
Payer: COMMERCIAL

## 2021-07-26 DIAGNOSIS — H52.00 HYPERMETROPIA, UNSPECIFIED LATERALITY: Primary | ICD-10-CM

## 2021-07-26 DIAGNOSIS — Z12.5 SCREENING FOR PROSTATE CANCER: ICD-10-CM

## 2021-07-26 DIAGNOSIS — I10 HTN, GOAL BELOW 140/90: ICD-10-CM

## 2021-07-26 DIAGNOSIS — H47.239 LARGE PHYSIOLOGIC CUPPING OF OPTIC DISC: ICD-10-CM

## 2021-07-26 LAB
ANION GAP SERPL CALCULATED.3IONS-SCNC: 12 MMOL/L (ref 5–18)
BUN SERPL-MCNC: 9 MG/DL (ref 8–22)
CALCIUM SERPL-MCNC: 9.7 MG/DL (ref 8.5–10.5)
CHLORIDE BLD-SCNC: 104 MMOL/L (ref 98–107)
CO2 SERPL-SCNC: 26 MMOL/L (ref 22–31)
CREAT SERPL-MCNC: 0.75 MG/DL (ref 0.6–1.3)
GFR SERPL CREATININE-BSD FRML MDRD: >90 ML/MIN/1.73M2
GLUCOSE BLD-MCNC: 107 MG/DL (ref 70–125)
POTASSIUM BLD-SCNC: 4.3 MMOL/L (ref 3.5–5)
PSA SERPL-MCNC: 0.99 UG/L (ref 0–4)
SODIUM SERPL-SCNC: 142 MMOL/L (ref 136–145)

## 2021-07-26 PROCEDURE — 80048 BASIC METABOLIC PNL TOTAL CA: CPT

## 2021-07-26 PROCEDURE — G0103 PSA SCREENING: HCPCS

## 2021-07-26 PROCEDURE — 92015 DETERMINE REFRACTIVE STATE: CPT | Performed by: OPHTHALMOLOGY

## 2021-07-26 PROCEDURE — 92004 COMPRE OPH EXAM NEW PT 1/>: CPT | Performed by: OPHTHALMOLOGY

## 2021-07-26 PROCEDURE — 36415 COLL VENOUS BLD VENIPUNCTURE: CPT

## 2021-07-26 RX ORDER — CALCIUM CARBONATE 500(1250)
TABLET,CHEWABLE ORAL
COMMUNITY
End: 2021-11-01

## 2021-07-26 ASSESSMENT — SLIT LAMP EXAM - LIDS
COMMENTS: NORMAL
COMMENTS: NORMAL

## 2021-07-26 ASSESSMENT — EXTERNAL EXAM - LEFT EYE: OS_EXAM: NORMAL

## 2021-07-26 ASSESSMENT — TONOMETRY
OS_IOP_MMHG: 13
IOP_METHOD: ICARE
OD_IOP_MMHG: 12

## 2021-07-26 ASSESSMENT — CONF VISUAL FIELD
METHOD: COUNTING FINGERS
OS_NORMAL: 1
OD_NORMAL: 1

## 2021-07-26 ASSESSMENT — REFRACTION_MANIFEST
OD_SPHERE: +0.25
OS_SPHERE: +0.25
OD_ADD: +1.50
OD_CYLINDER: SPHERE
OS_ADD: +1.50
OS_CYLINDER: SPHERE

## 2021-07-26 ASSESSMENT — VISUAL ACUITY
OD_SC: 20/20
OS_SC: 20/15
OD_SC: 20/20
METHOD: SNELLEN - LINEAR
OS_SC: 20/20

## 2021-07-26 ASSESSMENT — CUP TO DISC RATIO
OS_RATIO: 0.5
OD_RATIO: 0.5

## 2021-07-26 ASSESSMENT — EXTERNAL EXAM - RIGHT EYE: OD_EXAM: NORMAL

## 2021-07-26 NOTE — NURSING NOTE
Chief Complaints and History of Present Illnesses   Patient presents with     COMPREHENSIVE EYE EXAM     Chief Complaint(s) and History of Present Illness(es)     COMPREHENSIVE EYE EXAM     Laterality: both eyes    Treatments tried: no treatments    Pain scale: 0/10              Comments     New pt here today for routine comprehensive eye exam.  Pt states vision is stable - not using anything for distance or near vision.  Pt sometimes uses blue blocker for computer screen.  No eye health or vision concerns today, just having a formal exam.     Pt denies any eye medications.    Yonas ROMEO, STAN 10:34 AM 07/26/2021

## 2021-07-26 NOTE — PROGRESS NOTES
HPI  Vasiliy Hay is a 46 year old adult here for comprehensive eye exam.  States vision is stable - not using anything for distance or near vision.  No blurry vision, eye pain, or irritation.   Denies headache.     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 Patient to call sooner as needed.    Complete documentation of historical and exam elements from today's encounter can be found in the full encounter summary report (not reduplicated in this progress note). I personally obtained the chief complaint(s) and history of present illness.  I have confirmed and edited as necessary the CC, HPI, PMH/PSH, social history, FMH, ROS, and exam/neuro findings as obtained by the technician or others. I have examined this patient myself and I personally viewed the image(s) and studies listed above and the documentation reflects my findings and interpretation.     Valery Wang MD

## 2021-08-20 DIAGNOSIS — I10 BENIGN ESSENTIAL HYPERTENSION: ICD-10-CM

## 2021-08-24 RX ORDER — LOSARTAN POTASSIUM 100 MG/1
TABLET ORAL
Qty: 90 TABLET | Refills: 1 | Status: SHIPPED | OUTPATIENT
Start: 2021-08-24 | End: 2022-06-14

## 2021-08-24 NOTE — TELEPHONE ENCOUNTER
"Routing refill request to provider for review/approval because:  Labs not current:  Multiple     Last Written Prescription Date:  7/23/21  Last Fill Quantity: 30,  # refills: 0   Last office visit provider:  4/16/21     Requested Prescriptions   Pending Prescriptions Disp Refills     losartan (COZAAR) 100 MG tablet [Pharmacy Med Name: LOSARTAN 100MG TABLETS] 30 tablet 0     Sig: TAKE 1 TABLET(100 MG) BY MOUTH DAILY       Angiotensin-II Receptors Failed - 8/20/2021  5:22 PM        Failed - Last blood pressure under 140/90 in past 12 months     BP Readings from Last 3 Encounters:   04/16/21 (!) 136/90                 Failed - Normal serum creatinine on file in past 12 months     No lab results found.    Ok to refill medication if creatinine is low          Failed - Normal serum potassium on file in past 12 months     No lab results found.                 Passed - Recent (12 mo) or future (30 days) visit within the authorizing provider's specialty     Patient has had an office visit with the authorizing provider or a provider within the authorizing providers department within the previous 12 mos or has a future within next 30 days. See \"Patient Info\" tab in inbasket, or \"Choose Columns\" in Meds & Orders section of the refill encounter.              Passed - Medication is active on med list        Passed - Patient is age 18 or older             Ambrosio Mcgrath RN 08/24/21 3:57 PM  "

## 2021-09-21 DIAGNOSIS — I10 HTN, GOAL BELOW 140/90: ICD-10-CM

## 2021-09-22 RX ORDER — LOSARTAN POTASSIUM 100 MG/1
TABLET ORAL
Qty: 90 TABLET | Refills: 2 | Status: SHIPPED | OUTPATIENT
Start: 2021-09-22 | End: 2022-03-30

## 2021-10-02 ENCOUNTER — HEALTH MAINTENANCE LETTER (OUTPATIENT)
Age: 46
End: 2021-10-02

## 2021-11-01 ENCOUNTER — OFFICE VISIT (OUTPATIENT)
Dept: FAMILY MEDICINE | Facility: CLINIC | Age: 46
End: 2021-11-01
Payer: COMMERCIAL

## 2021-11-01 VITALS
BODY MASS INDEX: 26.18 KG/M2 | WEIGHT: 182.9 LBS | SYSTOLIC BLOOD PRESSURE: 130 MMHG | HEIGHT: 70 IN | HEART RATE: 75 BPM | DIASTOLIC BLOOD PRESSURE: 80 MMHG | OXYGEN SATURATION: 97 %

## 2021-11-01 DIAGNOSIS — Z23 NEED FOR TETANUS, DIPHTHERIA, AND ACELLULAR PERTUSSIS (TDAP) VACCINE: ICD-10-CM

## 2021-11-01 DIAGNOSIS — Z00.00 HEALTH CARE MAINTENANCE: ICD-10-CM

## 2021-11-01 DIAGNOSIS — Z23 NEED FOR INFLUENZA VACCINATION: ICD-10-CM

## 2021-11-01 DIAGNOSIS — I10 BENIGN ESSENTIAL HYPERTENSION: Primary | ICD-10-CM

## 2021-11-01 PROCEDURE — 90715 TDAP VACCINE 7 YRS/> IM: CPT | Performed by: FAMILY MEDICINE

## 2021-11-01 PROCEDURE — 90472 IMMUNIZATION ADMIN EACH ADD: CPT | Performed by: FAMILY MEDICINE

## 2021-11-01 PROCEDURE — 90686 IIV4 VACC NO PRSV 0.5 ML IM: CPT | Performed by: FAMILY MEDICINE

## 2021-11-01 PROCEDURE — 90471 IMMUNIZATION ADMIN: CPT | Performed by: FAMILY MEDICINE

## 2021-11-01 PROCEDURE — 99213 OFFICE O/P EST LOW 20 MIN: CPT | Mod: 25 | Performed by: FAMILY MEDICINE

## 2021-11-01 ASSESSMENT — MIFFLIN-ST. JEOR: SCORE: 1723.69

## 2021-11-02 NOTE — PROGRESS NOTES
"ASSESSMENT/PLAN:       1. Benign essential hypertension  ***    2. Need for tetanus, diphtheria, and acellular pertussis (Tdap) vaccine  ***  - TDAP VACCINE (Adacel, Boostrix)  [9059003]    3. Need for influenza vaccination  ***  -  FLU VAC PRESRV FREE QUAD SPLIT VIR > 6 MONTHS IM ([id number])    4. Health care maintenance  ***  - REVIEW OF HEALTH MAINTENANCE PROTOCOL ORDERS        No immunizations due    Brad Trinh MD      PROGRESS NOTE   11/2/2021    SUBJECTIVE:  [id number]  who presents for   Chief Complaint   Patient presents with     Blood Pressure Check     Was a little high last time, needs to check and possibly change medication. Gets headaches when BP is high. Home cuff is reading higher than our cuff in the exam room.          Patient Active Problem List   Diagnosis     Positive TB test     CARDIOVASCULAR SCREENING; LDL GOAL LESS THAN 130     HTN, goal below 140/90     Abnormal laboratory test result       Current Outpatient Medications   Medication Sig Dispense Refill     losartan (COZAAR) 100 MG tablet TAKE 1 TABLET(100 MG) BY MOUTH DAILY 90 tablet 2       History   Smoking Status     Never Smoker   Smokeless Tobacco     Never Used           OBJECTIVE:        @[id number]@    Vitals:    11/01/21 1556 11/01/21 1602   BP: 128/64 130/80   BP Location: Left arm Right arm   Patient Position: Sitting Sitting   Cuff Size: Adult Regular Adult Regular   Pulse: 75    SpO2: 97%    Weight: 83 kg (182 lb 14.4 oz)    Height: 1.783 m (5' 10.18\")      [unfilled]        Physical Exam:  GENERAL APPEARANCE: A&A, NAD, well hydrated, well nourished  SKIN:  Normal skin turgor, no lesions/rashes   HEENT: moist mucous membranes, no rhinorrhea  NECK: Normal without adenopathy or masses  CV: RRR, no M/G/R   LUNGS: CTAB  ABDOMEN: S&NT, no masses or enlarged organs   EXTREMITY: no edema and full ROM of all joints  NEURO: no focal findings    "

## 2021-11-02 NOTE — PROGRESS NOTES
ASSESSMENT/PLAN:       1. Benign essential hypertension  It appears to me that his blood pressure is likely well controlled and we should not make decisions from his wrist cuff that he is using at home.  Suggested he get a new blood pressure cuff regular adult size above the elbow.  Continue with losartan 100 mg daily    Recommend low-salt diet  Regular exercise    2. Need for tetanus, diphtheria, and acellular pertussis (Tdap) vaccine     - TDAP VACCINE (Adacel, Boostrix)  [[id number]]    3. Need for influenza vaccination     - HC FLU VAC PRESRV FREE QUAD SPLIT VIR > 6 MONTHS IM ([id number])    4. Health care maintenance     - REVIEW OF HEALTH MAINTENANCE PROTOCOL ORDERS    Follow-up with Carlos in 6 months preventive health care visit  Office visit E/M total time same days encounter 20 minutes    No immunizations due    Brad Trinh MD      PROGRESS NOTE   11/2/2021    SUBJECTIVE:  [id number]  who presents for   Chief Complaint   Patient presents with     Blood Pressure Check     Was a little high last time, needs to check and possibly change medication. Gets headaches when BP is high. Home cuff is reading higher than our cuff in the exam room.      The patient has a [facility] brand risks cough that rates significantly higher than our readings in the office.  His reading was 155/98 and our reading was 130/80.  The patient that means cuffs are not very accurate and that he should get a above the elbow regular adult cuff and a good brand is [employer].  He has been on losartan for a number of years and seems to tolerate it fine.  He is on 100 mg daily.  He had been on hydrochlorothiazide and that was discontinued because of some side effects.  The patient had a metabolic panel in July 2021 with normal electrolytes and kidney function.    He would like to get his immunizations updated including the influenza vaccine and a tetanus booster    Patient Active Problem List   Diagnosis     Positive TB test     CARDIOVASCULAR  "SCREENING; LDL GOAL LESS THAN 130     HTN, goal below 140/90     Abnormal laboratory test result       Current Outpatient Medications   Medication Sig Dispense Refill     losartan (COZAAR) 100 MG tablet TAKE 1 TABLET(100 MG) BY MOUTH DAILY 90 tablet 2       History   Smoking Status     Never Smoker   Smokeless Tobacco     Never Used           OBJECTIVE:        Vitals:    11/01/21 1556 11/01/21 1602   BP: 128/64 130/80   BP Location: Left arm Right arm   Patient Position: Sitting Sitting   Cuff Size: Adult Regular Adult Regular   Pulse: 75    SpO2: 97%    Weight: 83 kg (182 lb 14.4 oz)    Height: 1.783 m (5' 10.18\")      Wt Readings from Last 3 Encounters:   11/01/21 83 kg (182 lb 14.4 oz)   10/27/18 80.6 kg (177 lb 11.2 oz)   10/05/18 81.2 kg (179 lb)           Physical Exam:  GENERAL APPEARANCE: Very pleasant 46-year-old male, NAD, well hydrated, well nourished  SKIN:  Normal skin turgor, no lesions/rashes   HEENT: moist mucous membranes, no rhinorrhea  NECK: Normal without adenopathy or masses  CV: RRR, no M/G/R   LUNGS: CTAB  ABDOMEN: S&NT, no masses or enlarged organs   EXTREMITY: no edema and full ROM of all joints  NEURO: no focal findings    "

## 2021-11-23 ENCOUNTER — IMMUNIZATION (OUTPATIENT)
Dept: NURSING | Facility: CLINIC | Age: 46
End: 2021-11-23
Payer: COMMERCIAL

## 2021-11-23 PROCEDURE — 0004A PR COVID VAC PFIZER DIL RECON 30 MCG/0.3 ML IM: CPT

## 2021-11-23 PROCEDURE — 91300 PR COVID VAC PFIZER DIL RECON 30 MCG/0.3 ML IM: CPT

## 2022-03-27 DIAGNOSIS — I10 BENIGN ESSENTIAL HYPERTENSION: ICD-10-CM

## 2022-03-29 NOTE — TELEPHONE ENCOUNTER
"Routing refill request to provider for review/approval because:  Labs not current:  Multiple  BP not in range.    Last Written Prescription Date:  8/24/21  Last Fill Quantity: 90,  # refills: 1   Last office visit provider:  4/16/21     Requested Prescriptions   Pending Prescriptions Disp Refills     losartan (COZAAR) 100 MG tablet [Pharmacy Med Name: LOSARTAN 100MG TABLETS] 90 tablet 1     Sig: TAKE 1 TABLET(100 MG) BY MOUTH DAILY       Angiotensin-II Receptors Failed - 3/29/2022  1:44 PM        Failed - Last blood pressure under 140/90 in past 12 months     BP Readings from Last 3 Encounters:   04/16/21 (!) 136/90                 Failed - Normal serum creatinine on file in past 12 months     No lab results found.    Ok to refill medication if creatinine is low          Failed - Normal serum potassium on file in past 12 months     No lab results found.                 Passed - Recent (12 mo) or future (30 days) visit within the authorizing provider's specialty     Patient has had an office visit with the authorizing provider or a provider within the authorizing providers department within the previous 12 mos or has a future within next 30 days. See \"Patient Info\" tab in inbasket, or \"Choose Columns\" in Meds & Orders section of the refill encounter.              Passed - Medication is active on med list        Passed - Patient is age 18 or older             Ambrosio Mcgrath RN 03/29/22 1:44 PM  "

## 2022-03-30 DIAGNOSIS — I10 HTN, GOAL BELOW 140/90: ICD-10-CM

## 2022-03-30 RX ORDER — LOSARTAN POTASSIUM 100 MG/1
TABLET ORAL
Qty: 90 TABLET | Refills: 0 | OUTPATIENT
Start: 2022-03-30

## 2022-03-30 RX ORDER — LOSARTAN POTASSIUM 100 MG/1
100 TABLET ORAL DAILY
Qty: 90 TABLET | Refills: 0 | Status: SHIPPED | OUTPATIENT
Start: 2022-03-30 | End: 2022-06-14

## 2022-04-15 ENCOUNTER — HOSPITAL ENCOUNTER (EMERGENCY)
Facility: CLINIC | Age: 47
Discharge: HOME | End: 2022-04-15
Attending: EMERGENCY MEDICINE | Admitting: EMERGENCY MEDICINE
Payer: OTHER MISCELLANEOUS

## 2022-04-15 VITALS
SYSTOLIC BLOOD PRESSURE: 126 MMHG | DIASTOLIC BLOOD PRESSURE: 99 MMHG | OXYGEN SATURATION: 96 % | RESPIRATION RATE: 16 BRPM | HEART RATE: 75 BPM | BODY MASS INDEX: 26.07 KG/M2 | WEIGHT: 176 LBS | TEMPERATURE: 97.9 F | HEIGHT: 69 IN

## 2022-04-15 DIAGNOSIS — Z77.098 CHEMICAL EXPOSURE OF EYE: ICD-10-CM

## 2022-04-15 PROCEDURE — 99282 EMERGENCY DEPT VISIT SF MDM: CPT | Performed by: EMERGENCY MEDICINE

## 2022-04-15 RX ORDER — PROPARACAINE HYDROCHLORIDE 5 MG/ML
1 SOLUTION/ DROPS OPHTHALMIC ONCE
Status: DISCONTINUED | OUTPATIENT
Start: 2022-04-15 | End: 2022-04-15 | Stop reason: HOSPADM

## 2022-04-15 ASSESSMENT — ENCOUNTER SYMPTOMS
PHOTOPHOBIA: 0
EYE REDNESS: 0
COUGH: 0
EYE DISCHARGE: 0
FEVER: 0
SHORTNESS OF BREATH: 0
EYE PAIN: 1

## 2022-04-15 ASSESSMENT — VISUAL ACUITY
OS: 20/15
OD: 20/15

## 2022-04-15 NOTE — ED PROVIDER NOTES
ED Provider Note  [facility]      History     Chief Complaint   Patient presents with     Chemical Exposure     Pt got splashed in the lt eye with the solution from the Purple top wipes.      HPI  Vasiliy Hay is a 47 year old male employee who presents to the emergency department for evaluation of left eye exposure to purple Sani cloth wipe fluid.  Patient states that he was pulling out Sani wipes to clean his workstation today when the solution splashed in his left eye.  He complains of some burning sensation in his eye but no difficulty seeing.  Denies any foreign body sensation.  He does not wear contacts or corrective lenses.  He denies any other symptoms or concerns.    Past Medical History  Past Medical History:   Diagnosis Date     CARDIOVASCULAR SCREENING; LDL GOAL LESS THAN 130      Hypertension      Positive TB test      Past Surgical History:   Procedure Laterality Date     ORTHOPEDIC SURGERY      L finger s/p injury     losartan (COZAAR) 100 MG tablet      No Known Allergies  Family History  Family History   Problem Relation Age of Onset     Hypertension Father      Social History   Social History     Tobacco Use     Smoking status: Never Smoker     Smokeless tobacco: Never Used   Substance Use Topics     Alcohol use: No     Drug use: No      Past medical history, past surgical history, medications, allergies, family history, and social history were reviewed with the patient. No additional pertinent items.       Review of Systems   Constitutional: Negative for fever.   Eyes: Positive for pain. Negative for photophobia, discharge, redness and visual disturbance.   Respiratory: Negative for cough and shortness of breath.    Cardiovascular: Negative for chest pain.   All other systems reviewed and are negative.    A complete review of systems was performed with pertinent positives and negatives noted in the HPI, and all other systems negative.    Physical Exam   BP:  "131/87  Pulse: 75  Temp: 97.9  F (36.6  C)  Resp: 14  Height: 175.3 cm (5' 9\")  Weight: 79.8 kg (176 lb)  SpO2: 99 %  Physical Exam  Constitutional:       Appearance: Normal appearance.   HENT:      Head: Normocephalic and atraumatic.   Eyes:      General:         Left eye: No foreign body.      Extraocular Movements: Extraocular movements intact.      Right eye: Normal extraocular motion.      Left eye: Normal extraocular motion and no nystagmus.      Conjunctiva/sclera: Conjunctivae normal.      Left eye: Left conjunctiva is not injected. No hemorrhage.     Pupils: Pupils are equal, round, and reactive to light.      Left eye: No corneal abrasion or fluorescein uptake.      Slit lamp exam:     Left eye: No corneal flare, corneal ulcer, hyphema, hypopyon, anterior chamber flares or photophobia.      Comments:  PH= 7   Neurological:      Mental Status: He is alert.         ED Course      Procedures                     No results found for any visits on 04/15/22.  Medications - No data to display     Assessments & Plan (with Medical Decision Making)   47 year old male to the emergency department for evaluation of occupational ocular exposure to purple wipe Sani Cloth solution in his left eye.  The patient's vision is normal.  He has no symptoms after his eye was irrigated with 1 L of normal saline.  His eye exam is normal including pH and slit-lamp exam with fluorescein.  Suspect mild chemical conjunctivitis.  The patient will be discharged home.  He can return to work without restrictions.  He can utilize saline eyedrops as needed for eye irritation.  If significant symptoms recur, he should return to the emergency department or follow-up with ophthalmology clinic.    I have reviewed the nursing notes. I have reviewed the findings, diagnosis, plan and need for follow up with the patient.    Discharge Medication List as of 4/15/2022 11:21 AM          Final diagnoses:   Chemical exposure of eye     Chart documentation " was completed with Dragon voice-recognition software. Even though reviewed, this chart may still contain some grammatical, spelling, and word errors.     --  Tony Clemons Md  [facility] EMERGENCY DEPARTMENT  4/15/2022     Tony Clemons MD  04/16/22 1045

## 2022-04-15 NOTE — DISCHARGE INSTRUCTIONS
You may return to work without limitations or restrictions.  You may use saline eyedrops as needed to help comfort your eye if it feels irritated.    Please make an appointment to follow up with Eye Clinic (phone: [phone]) if any concerns.    Return to the emergency department if recurrent pain, difficulty with your vision, or other concerns.

## 2022-05-14 ENCOUNTER — HEALTH MAINTENANCE LETTER (OUTPATIENT)
Age: 47
End: 2022-05-14

## 2022-06-12 NOTE — PROGRESS NOTES
Nurse Note      Itinerary:  [facility]       Departure Date: 7/3/22      Return Date: 9/15/22      Length of Trip 2 months       Reason for Travel: Visiting friends and relatives           Urban or rural: both      Accommodations: Family home        IMMUNIZATION HISTORY  Have you received any immunizations within the past 4 weeks?  No  Have you ever fainted from having your blood drawn or from an injection?  No  Have you ever had a fever reaction to vaccination?  No  Have you ever had any bad reaction or side effect from any vaccination?  No  Have you ever had hepatitis A or B vaccine?  Yes  Do you live (or work closely) with anyone who has AIDS, an AIDS-like condition, any other immune disorder or who is on chemotherapy for cancer?  No  Do you have a family history of immunodeficiency?  No  Have you received any injection of immune globulin or any blood products during the past 12 months?  No    Patient roomed by KETURAH Watkins  Vasiliy Hay is a 47 year old male  seen today with family member for counsultation for international travel.   Patient will be departing in  2.5 week(s) and  traveling with family member(s).      Patient itinerary :  Will transit [place] then travel to [facility] and [facility] which risk for Malaria, Yellow Fever, food borne illnesses, motor vehicle accidents and covid. exposure.      Patient's activities will include visiting friends and relatives.    Patient's country of birth is [facility], arrival to      Special medical concerns: none  Pre-travel questionnaire was completed by patient and reviewed by provider.     Vitals: /82   Pulse 81   Temp 97.6  F (36.4  C) (Tympanic)   Wt 78.4 kg (172 lb 14.4 oz)   SpO2 99%   BMI 25.53 kg/m    BMI= Body mass index is 25.53 kg/m .    EXAM:  General:  Well-nourished, well-developed in no acute distress.  Appears to be stated age, interacts appropriately and expresses understanding of information given to  patient.    Current Outpatient Medications   Medication Sig Dispense Refill     atovaquone-proguanil (MALARONE) 250-100 MG tablet Take 1 tablet by mouth daily Start 2 days before exposure to Malaria and continue daily till  7 days after exposure. 85 tablet 0     azithromycin (ZITHROMAX) 500 MG tablet Take 1 tablet (500 mg) by mouth daily for 3 doses Take 1 tablet a day for up to 3 days for severe diarrhea 3 tablet 0     calcium, as carbonate, (TUMS) 200 mg calcium (500 mg) chewable tablet [CALCIUM, AS CARBONATE, (TUMS) 200 MG CALCIUM (500 MG) CHEWABLE TABLET] Chew.       losartan (COZAAR) 100 MG tablet Take 1 tablet (100 mg) by mouth daily 90 tablet 3     Patient Active Problem List   Diagnosis     HTN, goal below 140/90     Positive TB test     No Known Allergies      Immunizations discussed include:  Healthcare provider    Covid 19: Up to date  Hepatitis A:  Ordered/given today, risks, benefits and side effects reviewed  Hepatitis B: Up to date  Influenza: Up to date  Typhoid: Ordered/given today, risks, benefits and side effects reviewed  Rabies: Declined  reviewed managment of a animal bite or scratch (washing wound, seek medical care within 24 hours for post exposure prophylaxis )  Yellow Fever: Up to date per report.  Records not available at the time  Nauruan Encephalitis: Not indicated  Meningococcus: Declined  Not concerned about risk of disease  Tetanus/Diphtheria: Up to date  Measles/Mumps/Rubella: Up to date  Cholera: Not needed  Polio: Up to date  Pneumococcal: Under age of 65  Varicella: Up to date  Shingrix: Not indicated  HPV:  Not indicated     TB: low risk     Altitude Exposure on this trip: no  Past tolerance to Altitude: na    ASSESSMENT/PLAN:  Vasiliy was seen today for travel clinic.    Diagnoses and all orders for this visit:    Travel advice encounter  -     atovaquone-proguanil (MALARONE) 250-100 MG tablet; Take 1 tablet by mouth daily Start 2 days before exposure to Malaria and continue  daily till  7 days after exposure.  -     azithromycin (ZITHROMAX) 500 MG tablet; Take 1 tablet (500 mg) by mouth daily for 3 doses Take 1 tablet a day for up to 3 days for severe diarrhea    Other orders  -     TYPHOID VACCINE, IM  -     HEPATITIS A VACCINE (ADULT)      I have reviewed general recommendations for safe travel   including: food/water precautions, insect precautions, safer sex   practices given high prevalence of Zika, HIV and other STDs,   roadway safety. Educational materials and [employer] report provided.    Malaraia prophylaxis recommended: Malarone  Symptomatic treatment for traveler's diarrhea: azithromycin    Evacuation insurance advised and resources were provided to patient.    Total visit time 20 minutes  with over 50% of time spent counseling patient as detailed above.    Melonie Morales CNP  Certificate in Travel Health

## 2022-06-14 ENCOUNTER — OFFICE VISIT (OUTPATIENT)
Dept: FAMILY MEDICINE | Facility: CLINIC | Age: 47
End: 2022-06-14
Payer: COMMERCIAL

## 2022-06-14 VITALS
WEIGHT: 173.3 LBS | BODY MASS INDEX: 25.59 KG/M2 | DIASTOLIC BLOOD PRESSURE: 86 MMHG | OXYGEN SATURATION: 97 % | HEART RATE: 77 BPM | SYSTOLIC BLOOD PRESSURE: 124 MMHG | TEMPERATURE: 97.6 F

## 2022-06-14 DIAGNOSIS — Z12.11 SCREEN FOR COLON CANCER: ICD-10-CM

## 2022-06-14 DIAGNOSIS — Z13.220 LIPID SCREENING: ICD-10-CM

## 2022-06-14 DIAGNOSIS — Z11.59 ENCOUNTER FOR HEPATITIS C SCREENING TEST FOR LOW RISK PATIENT: ICD-10-CM

## 2022-06-14 DIAGNOSIS — Z00.00 ROUTINE GENERAL MEDICAL EXAMINATION AT A HEALTH CARE FACILITY: Primary | ICD-10-CM

## 2022-06-14 LAB
ANION GAP SERPL CALCULATED.3IONS-SCNC: 9 MMOL/L (ref 5–18)
BUN SERPL-MCNC: 9 MG/DL (ref 8–22)
CALCIUM SERPL-MCNC: 9.3 MG/DL (ref 8.5–10.5)
CHLORIDE BLD-SCNC: 103 MMOL/L (ref 98–107)
CHOLEST SERPL-MCNC: 184 MG/DL
CO2 SERPL-SCNC: 26 MMOL/L (ref 22–31)
CREAT SERPL-MCNC: 0.72 MG/DL (ref 0.7–1.3)
FASTING STATUS PATIENT QL REPORTED: YES
GFR SERPL CREATININE-BSD FRML MDRD: >90 ML/MIN/1.73M2
GLUCOSE BLD-MCNC: 82 MG/DL (ref 70–125)
HDLC SERPL-MCNC: 54 MG/DL
LDLC SERPL CALC-MCNC: 118 MG/DL
POTASSIUM BLD-SCNC: 4.6 MMOL/L (ref 3.5–5)
SODIUM SERPL-SCNC: 138 MMOL/L (ref 136–145)
TRIGL SERPL-MCNC: 58 MG/DL

## 2022-06-14 PROCEDURE — 80061 LIPID PANEL: CPT | Performed by: NURSE PRACTITIONER

## 2022-06-14 PROCEDURE — 36415 COLL VENOUS BLD VENIPUNCTURE: CPT | Performed by: NURSE PRACTITIONER

## 2022-06-14 PROCEDURE — 80048 BASIC METABOLIC PNL TOTAL CA: CPT | Performed by: NURSE PRACTITIONER

## 2022-06-14 PROCEDURE — 99386 PREV VISIT NEW AGE 40-64: CPT | Performed by: NURSE PRACTITIONER

## 2022-06-14 PROCEDURE — 86803 HEPATITIS C AB TEST: CPT | Performed by: NURSE PRACTITIONER

## 2022-06-14 RX ORDER — LOSARTAN POTASSIUM 100 MG/1
100 TABLET ORAL DAILY
Qty: 90 TABLET | Refills: 3 | Status: SHIPPED | OUTPATIENT
Start: 2022-06-14 | End: 2023-06-26

## 2022-06-14 ASSESSMENT — PAIN SCALES - GENERAL: PAINLEVEL: NO PAIN (0)

## 2022-06-14 NOTE — PROGRESS NOTES
SUBJECTIVE:   CC: Vasiliy Hay is an 47 year old male who presents for preventative health visit.       HPI    Today's PHQ-2 Score:   PHQ-2 ( 1999 Pfizer) 6/14/2022   Q1: Little interest or pleasure in doing things 0   Q2: Feeling down, depressed or hopeless 0   PHQ-2 Score 0   PHQ-2 Total Score (12-17 Years)- Positive if 3 or more points; Administer PHQ-A if positive -   Q1: Little interest or pleasure in doing things Not at all   Q2: Feeling down, depressed or hopeless Not at all   PHQ-2 Score 0       Abuse: Current or Past(Physical, Sexual or Emotional)- No  Do you feel safe in your environment? Yes    Have you ever done Advance Care Planning? (For example, a Health Directive, POLST, or a discussion with a medical provider or your loved ones about your wishes): Yes, patient states has an Advance Care Planning document and will bring a copy to the clinic.    Social History     Tobacco Use     Smoking status: Never Smoker     Smokeless tobacco: Never Used   Substance Use Topics     Alcohol use: No         Alcohol Use 12/3/2015   Prescreen: >3 drinks/day or >7 drinks/week? The patient does not drink >3 drinks per day nor >7 drinks per week.       Last PSA:   Prostate Specific Antigen Screen   Date Value Ref Range Status   07/26/2021 0.99 0.00 - 4.00 ug/L Final       Reviewed orders with patient. Reviewed health maintenance and updated orders accordingly - Yes  Lab work is in process    Reviewed and updated as needed this visit by clinical staff   Tobacco  Allergies  Meds                Reviewed and updated as needed this visit by Provider                   Past Medical History:   Diagnosis Date     CARDIOVASCULAR SCREENING; LDL GOAL LESS THAN 130      Hypertension      Positive TB test       Past Surgical History:   Procedure Laterality Date     ORTHOPEDIC SURGERY      L finger s/p injury       Review of Systems  CONSTITUTIONAL: NEGATIVE for fever, chills, change in weight  INTEGUMENTARY/SKIN: NEGATIVE for  worrisome rashes, moles or lesions  EYES: NEGATIVE for vision changes or irritation  ENT: NEGATIVE for ear, mouth and throat problems  RESP: NEGATIVE for significant cough or SOB  CV: NEGATIVE for chest pain, palpitations or peripheral edema  GI: NEGATIVE for nausea, abdominal pain, heartburn, or change in bowel habits   male: negative for dysuria, hematuria, decreased urinary stream, erectile dysfunction, urethral discharge  MUSCULOSKELETAL: NEGATIVE for significant arthralgias or myalgia  NEURO: NEGATIVE for weakness, dizziness or paresthesias  PSYCHIATRIC: NEGATIVE for changes in mood or affect    OBJECTIVE:   /86 (BP Location: Right arm, Patient Position: Sitting, Cuff Size: Adult Regular)   Pulse 77   Temp 97.6  F (36.4  C)   Wt 78.6 kg (173 lb 4.8 oz)   SpO2 97%   BMI 25.59 kg/m      Physical Exam  GENERAL: healthy, alert and no distress  EYES: Eyes grossly normal to inspection, PERRL and conjunctivae and sclerae normal  HENT: ear canals and TM's normal, nose and mouth without ulcers or lesions  NECK: no adenopathy, no asymmetry, masses, or scars and thyroid normal to palpation  RESP: lungs clear to auscultation - no rales, rhonchi or wheezes  CV: regular rate and rhythm, normal S1 S2, no S3 or S4, no murmur, click or rub, no peripheral edema and peripheral pulses strong  ABDOMEN: soft, nontender, no hepatosplenomegaly, no masses and bowel sounds normal  MS: no gross musculoskeletal defects noted, no edema  SKIN: no suspicious lesions or rashes  NEURO: Normal strength and tone, mentation intact and speech normal  PSYCH: mentation appears normal, affect normal/bright    Diagnostic Test Results:  Labs reviewed in Epic    ASSESSMENT/PLAN:       ICD-10-CM    1. Routine general medical examination at a health care facility  Z00.00    2. Screen for colon cancer  Z12.11 Adult Gastro Ref - Procedure Only   3. Encounter for hepatitis C screening test for low risk patient  Z11.59 Hepatitis C Screen Reflex  "to HCV RNA Quant and Genotype     Hepatitis C Screen Reflex to HCV RNA Quant and Genotype   4. Lipid screening  Z13.220 Lipid panel     Lipid panel     CANCELED: Lipid panel     Doing well.  No changes.  Update screening labs.  Reviewed new guidelines regarding when to start screening colonoscopies.  Continue working on exercise.  Reviewed AHD.    Patient has been advised of split billing requirements and indicates understanding: No    COUNSELING:   Reviewed preventive health counseling, as reflected in patient instructions    Estimated body mass index is 25.59 kg/m  as calculated from the following:    Height as of 4/15/22: 1.753 m (5' 9\").    Weight as of this encounter: 78.6 kg (173 lb 4.8 oz).     He reports that he has never smoked. He has never used smokeless tobacco.      Counseling Resources:  ATP IV Guidelines  Pooled Cohorts Equation Calculator  FRAX Risk Assessment  ICSI Preventive Guidelines  Dietary Guidelines for Americans, 2010  USDA's MyPlate  ASA Prophylaxis  Lung CA Screening    Carlos Hines NP  [facility]      "

## 2022-06-14 NOTE — PATIENT INSTRUCTIONS
You look good today!  Keep up the good work with your diet    I made sure to send a years worth of your losartan to the pharmacy.  I also included extra information to the pharmacy that I am okay with you filling this early for your trip.  It is now going to be up to your insurance company to see if they will pay for an early refill    Updating yearly blood work today.    I did go ahead and order that colonoscopy and the contact information is in your paperwork in regards to scheduling.  You can get this done after you get back to the [place]      Preventive Health Recommendations  Male Ages 40 to 49    Yearly exam:             See your health care provider every year in order to  o   Review health changes.   o   Discuss preventive care.    o   Review your medicines if your doctor has prescribed any.    You should be tested each year for STDs (sexually transmitted diseases) if you re at risk.     Have a cholesterol test every 5 years.     Have a colonoscopy (test for colon cancer) if someone in your family has had colon cancer or polyps before age 50.     After age 45, have a diabetes test (fasting glucose). If you are at risk for diabetes, you should have this test every 3 years.      Talk with your health care provider about whether or not a prostate cancer screening test (PSA) is right for you.    Shots: Get a flu shot each year. Get a tetanus shot every 10 years.     Nutrition:    Eat at least 5 servings of fruits and vegetables daily.     Eat whole-grain bread, whole-wheat pasta and brown rice instead of white grains and rice.     Get adequate Calcium and Vitamin D.     Lifestyle    Exercise for at least 150 minutes a week (30 minutes a day, 5 days a week). This will help you control your weight and prevent disease.     Limit alcohol to one drink per day.     No smoking.     Wear sunscreen to prevent skin cancer.     See your dentist every six months for an exam and cleaning.

## 2022-06-14 NOTE — LETTER
Heather 15, 2022      Vasiliy PURNIMA Bharti  [address]  [place] [address]        Dear ,    We are writing to inform you of your test results.    Lab work looks good!  Cholesterol levels are normal, kidneys, electrolytes, liver, blood sugar look fine.  Your once in a lifetime low risk screening for hepatitis C is negative.    Resulted Orders   Lipid panel   Result Value Ref Range    Cholesterol 184 <=199 mg/dL    Triglycerides 58 <=149 mg/dL    Direct Measure HDL 54 >=40 mg/dL      Comment:      HDL Cholesterol Reference Range:     0-2 years:   No reference ranges established for patients under 2 years old  at [facility] for lipid analytes.    2-8 years:  Greater than 45 mg/dL     18 years and older:   Female: Greater than or equal to 50 mg/dL   Male:   Greater than or equal to 40 mg/dL    LDL Cholesterol Calculated 118 <=129 mg/dL    Patient Fasting > 8hrs? Yes    Basic metabolic panel   Result Value Ref Range    Sodium 138 136 - 145 mmol/L    Potassium 4.6 3.5 - 5.0 mmol/L    Chloride 103 98 - 107 mmol/L    Carbon Dioxide (CO2) 26 22 - 31 mmol/L    Anion Gap 9 5 - 18 mmol/L    Urea Nitrogen 9 8 - 22 mg/dL    Creatinine 0.72 0.70 - 1.30 mg/dL    Calcium 9.3 8.5 - 10.5 mg/dL    Glucose 82 70 - 125 mg/dL    GFR Estimate >90 >60 mL/min/1.73m2      Comment:      Effective December 21, 2021 eGFRcr in adults is calculated using the 2021 CKD-EPI creatinine equation which includes age and gender (Ivelisse et al., NEJM, DOI: 10.1056/SWURmc4841025)   Hepatitis C Screen Reflex to HCV RNA Quant and Genotype   Result Value Ref Range    Hepatitis C Antibody Nonreactive Nonreactive    Narrative    Assay performance characteristics have not been established for newborns, infants, and children.       If you have any questions or concerns, please call the clinic at the number listed above.       Sincerely,      Carlos Hines NP

## 2022-06-15 LAB — HCV AB SERPL QL IA: NONREACTIVE

## 2022-06-16 ENCOUNTER — OFFICE VISIT (OUTPATIENT)
Dept: FAMILY MEDICINE | Facility: CLINIC | Age: 47
End: 2022-06-16
Payer: COMMERCIAL

## 2022-06-16 VITALS
WEIGHT: 172.9 LBS | HEART RATE: 81 BPM | DIASTOLIC BLOOD PRESSURE: 82 MMHG | BODY MASS INDEX: 25.53 KG/M2 | SYSTOLIC BLOOD PRESSURE: 128 MMHG | TEMPERATURE: 97.6 F | OXYGEN SATURATION: 99 %

## 2022-06-16 DIAGNOSIS — Z71.84 TRAVEL ADVICE ENCOUNTER: Primary | ICD-10-CM

## 2022-06-16 PROCEDURE — 90472 IMMUNIZATION ADMIN EACH ADD: CPT | Performed by: NURSE PRACTITIONER

## 2022-06-16 PROCEDURE — 99401 PREV MED CNSL INDIV APPRX 15: CPT | Mod: 25 | Performed by: NURSE PRACTITIONER

## 2022-06-16 PROCEDURE — 90471 IMMUNIZATION ADMIN: CPT | Performed by: NURSE PRACTITIONER

## 2022-06-16 PROCEDURE — 90691 TYPHOID VACCINE IM: CPT | Performed by: NURSE PRACTITIONER

## 2022-06-16 PROCEDURE — 90632 HEPA VACCINE ADULT IM: CPT | Performed by: NURSE PRACTITIONER

## 2022-06-16 RX ORDER — AZITHROMYCIN 500 MG/1
500 TABLET, FILM COATED ORAL DAILY
Qty: 3 TABLET | Refills: 0 | Status: SHIPPED | OUTPATIENT
Start: 2022-06-16 | End: 2022-06-19

## 2022-06-16 RX ORDER — ATOVAQUONE AND PROGUANIL HYDROCHLORIDE 250; 100 MG/1; MG/1
1 TABLET, FILM COATED ORAL DAILY
Qty: 85 TABLET | Refills: 0 | Status: SHIPPED | OUTPATIENT
Start: 2022-06-16 | End: 2023-03-09

## 2022-06-16 NOTE — PATIENT INSTRUCTIONS
Thank you for visiting the [facility] : [phone]  Today June 16, 2022 you received the    Hepatitis A Vaccine - Please return on 12/13/22 or later for your 2nd and final dose.    Typhoid - injectable. This vaccine is valid for two years.     Follow up vaccine appointments can be made as a NURSE ONLY visit at the Travel Clinic, (BE PREPARED TO WAIT, ) or at designated [facility].    If you are receiving the Rabies vaccines series, it is important that you follow the exact schedule ordered.     Pre-travel     We recommend that you purchase Medical Evacuation Insurance prior to your departure.  Https://wwwnc.cdc.gov/travel/page/insurance    Leivasy your travel plans with the [place] Department of State through STEP ( Smart Traveler Enrollment Program ) https://step.[employer].gov.  STEP is a free service to allow [place] citizens and nationals traveling and living abroad to enroll their trip with the nearest [place] Embassy or Consulate.    Animal Exposure: Avoid all mammals even if they look healthy.  If there is a bite, scratch or even a lick, wash area immediately with soap and water for 15 minutes and seek medical care within 24 hours for evaluation of Rabies post exposure treatment.  Contact your Medical Evacuation Insurance.    COVID 19 (Sars Cov2) prevention strategies  Physical distancing: Maintain 6 foot (2m) from others.              Avoid large gatherings and public transportation.   Avoid indoor shopping malls, theaters and restaurants   Practice consistent mask wearing covering the nose, mouth and underneath the chin when unable to maintain 6 foot distance from others.  Hand washing: frequent, thorough handwashing with soap and water for 20 seconds (or using a hand  containing 60% alcohol)   Avoid touching face, nose, eyes, mouth unless you have done appropriate hand washing as above.   Clean high touch surfaces with approved disinfectant against Covid 19   (70% Ethanol ) or a bleach solution (add 20 mL (4 teaspoons) of bleach to 1 L (1 quart) of water;)  Be careful not to breath or touch bleach.      Travel Covid 19 Testing:  updated 12/06/2021  International travelers: Pre-travel: diagnostic testing (antigen or PCR) may be required for entry:  See country specific Embassy websites or airline websites.    [place] ENTRY Requirements: Effective December 6, 2021, all international arrivals to the [place] (regardless of vaccination status or citizenship status) by air are required to have a negative predeparture COVID-19 result from a test taken no more than 1 calendar day prior to departure of the flight to the [place]. Many complex scenarios may result from the 1-day rule. For example, for a flight that arrives in the [place] on a Monday, the test must have been taken no earlier than Sunday local time in the departure city. If the itinerary contains multiple flights, the test can be taken 1 day prior to departure of the first flight or can be taken en route, as long as the connecting airport is not in the [place]. If the test is unable to be taken en route, the traveler will not be able to enter the [place], or if the test is taken en route and is positive, the traveler will have to isolate in that location. If the itinerary contains 1 or more overnight stays en route to the [place], the test must have been taken 1 calendar day before the flight that will enter the [place]; however, if the itinerary has an overnight connection due to limitations in flight availability, retesting will not be required. If the first flight within an itinerary is delayed due to severe weather, aircraft mechanical issues, or other issues outside of the traveler's control, the traveler will only need to be retested if the delay causes the original test to be 24 hours or more past the 1-day window. If a connecting flight is delayed due to any of the above issues, the traveler will only need to be retested if the delay causes the  original test to be 48 hours or more past the 1-day window. If a trip of less than 1 day is made out the [place], a viral test taken in the [place] may be used as a predeparture test as long as the test was taken no more than 1 day prior to rearrival in the [place]; however, if a delay occurs on the return trip and the predeparture test is out of the 1-day window, the traveler will need to be retested before returning to the [place]. Noncitizen nonimmigrants who are unvaccinated remain banned from entry    Post travel: CDC recommends getting tested 3-5 days after your trip AND stay home and self-quarantine for 7 days.      COVID-19 testing scheduling number for pre-travel through [facility]  [phone] (Must have an order). Available 24 hours a day.  You can also schedule through My Chart.     Post-travel illness:  Contact your provider or [facility] if you develop a fever, rash, cough, diarrhea or other symptoms for up to 1 year after travel.  Inform your healthcare provider when and where you traveled to.    Please call the [facility] with any questions [phone]  Or send your provider a 'My Chart' note.

## 2022-09-03 ENCOUNTER — HEALTH MAINTENANCE LETTER (OUTPATIENT)
Age: 47
End: 2022-09-03

## 2022-09-29 RX ORDER — LOSARTAN POTASSIUM 100 MG/1
TABLET ORAL
Qty: 90 TABLET | Refills: 3 | OUTPATIENT
Start: 2022-09-29

## 2022-09-29 NOTE — TELEPHONE ENCOUNTER
Last Written Prescription Date:  6/14/22  Last Fill Quantity: 90,  # refills: 3     Should have refills on file.  Nila Barry RN on 9/29/2022 at 1:13 PM

## 2023-03-09 ENCOUNTER — OFFICE VISIT (OUTPATIENT)
Dept: FAMILY MEDICINE | Facility: CLINIC | Age: 48
End: 2023-03-09
Payer: COMMERCIAL

## 2023-03-09 VITALS
OXYGEN SATURATION: 99 % | HEART RATE: 77 BPM | TEMPERATURE: 98.4 F | BODY MASS INDEX: 27.55 KG/M2 | DIASTOLIC BLOOD PRESSURE: 78 MMHG | WEIGHT: 186 LBS | SYSTOLIC BLOOD PRESSURE: 124 MMHG | HEIGHT: 69 IN | RESPIRATION RATE: 14 BRPM

## 2023-03-09 DIAGNOSIS — R23.2 HOT FLASHES: Primary | ICD-10-CM

## 2023-03-09 LAB — TSH SERPL DL<=0.005 MIU/L-ACNC: 1 UIU/ML (ref 0.3–4.2)

## 2023-03-09 PROCEDURE — 84443 ASSAY THYROID STIM HORMONE: CPT | Performed by: FAMILY MEDICINE

## 2023-03-09 PROCEDURE — 99213 OFFICE O/P EST LOW 20 MIN: CPT | Performed by: FAMILY MEDICINE

## 2023-03-09 PROCEDURE — 36415 COLL VENOUS BLD VENIPUNCTURE: CPT | Performed by: FAMILY MEDICINE

## 2023-03-09 NOTE — PROGRESS NOTES
"  Assessment & Plan     (R23.2) Hot flashes  (primary encounter diagnosis)  Comment: pt state when he wakes up at night he feels whole body hot and worse on legs for 2 years.. Then he will take away the comforter and cool down. During the day he is fine. Denies fever, night sweat, pain, weight loss, cough. Denies restless leg at night. Denies leg numbness, tingling and weakness.  Likely hot flash    Pt was tb positive and no symptoms and he finished the treatment.     Plan: TSH with free T4 reflex      Will follow-up lab. Advise to keep room temp down a little at sleep and cove light.   bp reasonable control. Pt worried about the hot flash was side effect of medication losartan. Explained to pt that losartan dose not cause hot flash.   Pt declined colonoscope.      }     BMI:   Estimated body mass index is 27.47 kg/m  as calculated from the following:    Height as of this encounter: 1.753 m (5' 9\").    Weight as of this encounter: 84.4 kg (186 lb).   Weight management plan: Discussed healthy diet and exercise guidelines        No follow-ups on file.    Maria G Noel MD  [facility]    Jossie Amanda is a 48 year old, presenting for the following health issues:  Foot Problems (Pt reports his feet get extremely hot during the night and he can not sleep. Does not feel this during the day. No other sx.)      History of Present Illness       Reason for visit:  Feeling warm/ hot in leg at night that affects my sleep  Symptom onset:  More than a month  Symptoms include:  Na  Symptom intensity:  Moderate  Symptom progression:  Staying the same  Had these symptoms before:  No  What makes it worse:  Standing  What makes it better:  None    He eats 0-1 servings of fruits and vegetables daily.He consumes 0 sweetened beverage(s) daily.He exercises with enough effort to increase his heart rate 9 or less minutes per day.  He exercises with enough effort to increase his heart rate 3 or less days per " "week. He is missing 1 dose(s) of medications per week.  He is not taking prescribed medications regularly due to remembering to take.       Hypertension Follow-up      Do you check your blood pressure regularly outside of the clinic? No     Are you following a low salt diet? No    Are your blood pressures ever more than 140 on the top number (systolic) OR more   than 90 on the bottom number (diastolic), for example 140/90? No        Review of Systems   Constitutional, HEENT, cardiovascular, pulmonary, gi and gu systems are negative, except as otherwise noted.      Objective    /78 (BP Location: Right arm, Patient Position: Sitting, Cuff Size: Adult Regular)   Pulse 77   Temp 98.4  F (36.9  C) (Oral)   Resp 14   Ht 1.753 m (5' 9\")   Wt 84.4 kg (186 lb)   SpO2 99%   BMI 27.47 kg/m    Body mass index is 27.47 kg/m .  Physical Exam   GENERAL: healthy, alert and no distress  NECK: no adenopathy, no asymmetry, masses, or scars and thyroid normal to palpation  RESP: lungs clear to auscultation - no rales, rhonchi or wheezes  CV: regular rate and rhythm, normal S1 S2, no S3 or S4, no murmur, click or rub, no peripheral edema and peripheral pulses strong  ABDOMEN: soft, nontender, no hepatosplenomegaly, no masses and bowel sounds normal  MS: no gross musculoskeletal defects noted, no edema               "

## 2023-05-15 ENCOUNTER — PATIENT OUTREACH (OUTPATIENT)
Dept: CARE COORDINATION | Facility: CLINIC | Age: 48
End: 2023-05-15
Payer: COMMERCIAL

## 2023-05-30 ENCOUNTER — PATIENT OUTREACH (OUTPATIENT)
Dept: CARE COORDINATION | Facility: CLINIC | Age: 48
End: 2023-05-30
Payer: COMMERCIAL

## 2023-06-23 DIAGNOSIS — I10 HTN, GOAL BELOW 140/90: Primary | ICD-10-CM

## 2023-06-23 NOTE — TELEPHONE ENCOUNTER
"Routing refill request to provider for review/approval because:  Labs not current:  Creatinine and potassium     Last Written Prescription Date:  6/14/22  Last Fill Quantity: 90,  # refills: 3   Last office visit provider:   3/9/23    Requested Prescriptions   Pending Prescriptions Disp Refills     losartan (COZAAR) 100 MG tablet [Pharmacy Med Name: LOSARTAN 100MG TABLETS] 90 tablet 3     Sig: TAKE 1 TABLET(100 MG) BY MOUTH DAILY       Angiotensin-II Receptors Failed - 6/23/2023 12:22 PM        Failed - Normal serum creatinine on file in past 12 months     Recent Labs   Lab Test 06/14/22  1154   CR 0.72       Ok to refill medication if creatinine is low          Failed - Normal serum potassium on file in past 12 months     Recent Labs   Lab Test 06/14/22  1154   POTASSIUM 4.6                    Passed - Last blood pressure under 140/90 in past 12 months     BP Readings from Last 3 Encounters:   03/09/23 124/78   06/16/22 128/82   06/14/22 124/86                 Passed - Recent (12 mo) or future (30 days) visit within the authorizing provider's specialty     Patient has had an office visit with the authorizing provider or a provider within the authorizing providers department within the previous 12 mos or has a future within next 30 days. See \"Patient Info\" tab in inbasket, or \"Choose Columns\" in Meds & Orders section of the refill encounter.              Passed - Medication is active on med list        Passed - Patient is age 18 or older             ERICK STEINBERG RN 06/23/23 12:22 PM  "

## 2023-06-26 RX ORDER — LOSARTAN POTASSIUM 100 MG/1
TABLET ORAL
Qty: 90 TABLET | Refills: 0 | Status: SHIPPED | OUTPATIENT
Start: 2023-06-26 | End: 2023-08-14

## 2023-07-22 ENCOUNTER — HEALTH MAINTENANCE LETTER (OUTPATIENT)
Age: 48
End: 2023-07-22

## 2023-08-14 ENCOUNTER — OFFICE VISIT (OUTPATIENT)
Dept: FAMILY MEDICINE | Facility: CLINIC | Age: 48
End: 2023-08-14
Payer: COMMERCIAL

## 2023-08-14 VITALS
HEART RATE: 86 BPM | TEMPERATURE: 98.1 F | SYSTOLIC BLOOD PRESSURE: 124 MMHG | WEIGHT: 182 LBS | DIASTOLIC BLOOD PRESSURE: 78 MMHG | RESPIRATION RATE: 12 BRPM | HEIGHT: 72 IN | OXYGEN SATURATION: 97 % | BODY MASS INDEX: 24.65 KG/M2

## 2023-08-14 DIAGNOSIS — I10 HTN, GOAL BELOW 140/90: Primary | ICD-10-CM

## 2023-08-14 DIAGNOSIS — Z12.11 SCREEN FOR COLON CANCER: ICD-10-CM

## 2023-08-14 LAB
ANION GAP SERPL CALCULATED.3IONS-SCNC: 10 MMOL/L (ref 7–15)
BUN SERPL-MCNC: 12.9 MG/DL (ref 6–20)
CALCIUM SERPL-MCNC: 9.4 MG/DL (ref 8.6–10)
CHLORIDE SERPL-SCNC: 102 MMOL/L (ref 98–107)
CREAT SERPL-MCNC: 0.7 MG/DL (ref 0.67–1.17)
DEPRECATED HCO3 PLAS-SCNC: 27 MMOL/L (ref 22–29)
GFR SERPL CREATININE-BSD FRML MDRD: >90 ML/MIN/1.73M2
GLUCOSE SERPL-MCNC: 122 MG/DL (ref 70–99)
POTASSIUM SERPL-SCNC: 4.5 MMOL/L (ref 3.4–5.3)
SODIUM SERPL-SCNC: 139 MMOL/L (ref 136–145)

## 2023-08-14 PROCEDURE — 80048 BASIC METABOLIC PNL TOTAL CA: CPT | Performed by: FAMILY MEDICINE

## 2023-08-14 PROCEDURE — 36415 COLL VENOUS BLD VENIPUNCTURE: CPT | Performed by: FAMILY MEDICINE

## 2023-08-14 PROCEDURE — 99213 OFFICE O/P EST LOW 20 MIN: CPT | Performed by: FAMILY MEDICINE

## 2023-08-14 RX ORDER — LOSARTAN POTASSIUM 100 MG/1
100 TABLET ORAL DAILY
Qty: 90 TABLET | Refills: 3 | Status: SHIPPED | OUTPATIENT
Start: 2023-08-14 | End: 2024-10-02

## 2023-08-14 ASSESSMENT — PAIN SCALES - GENERAL: PAINLEVEL: NO PAIN (0)

## 2023-08-14 NOTE — PROGRESS NOTES
"  Assessment & Plan   Problem List Items Addressed This Visit          Circulatory    HTN, goal below 140/90 - Primary    Relevant Medications    losartan (COZAAR) 100 MG tablet    Other Relevant Orders    Basic metabolic panel (Completed)     Other Visit Diagnoses       Screen for colon cancer        Relevant Orders    Colonoscopy Screening  Referral                          JINNY SANTANA MD  [facility] ANNETTE Amanda is a 48 year old, presenting for the following health issues:  Hypertension (Pt has been taking BP at home. Pt states it is usually around 140/80-85. Pt reports no headaches and feeling good. )      8/14/2023     3:15 PM   Additional Questions   Roomed by Yoly DURAN CMA       History of Present Illness       Hypertension: He presents for follow up of hypertension.  He does check blood pressure  regularly outside of the clinic. Outpatient blood pressures have not been over 140/90. He follows a low salt diet.     He eats 2-3 servings of fruits and vegetables daily.He consumes 0 sweetened beverage(s) daily.He exercises with enough effort to increase his heart rate 10 to 19 minutes per day.  He exercises with enough effort to increase his heart rate 4 days per week.   He is taking medications regularly.               Review of Systems   Constitutional, HEENT, cardiovascular, pulmonary, gi and gu systems are negative, except as otherwise noted.      Objective    /78 (BP Location: Left arm, Patient Position: Sitting, Cuff Size: Adult Regular)   Pulse 86   Temp 98.1  F (36.7  C) (Oral)   Resp 12   Ht 1.816 m (5' 11.5\")   Wt 82.6 kg (182 lb)   SpO2 97%   BMI 25.03 kg/m    Body mass index is 25.03 kg/m .  Physical Exam   GENERAL: healthy, alert and no distress  NECK: no adenopathy, no asymmetry, masses, or scars and thyroid normal to palpation  RESP: lungs clear to auscultation - no rales, rhonchi or wheezes  CV: regular rate and rhythm, normal S1 S2, " no S3 or S4, no murmur, click or rub, no peripheral edema and peripheral pulses strong  ABDOMEN: soft, nontender, no hepatosplenomegaly, no masses and bowel sounds normal  MS: no gross musculoskeletal defects noted, no edema

## 2023-08-22 ENCOUNTER — TRANSFERRED RECORDS (OUTPATIENT)
Dept: HEALTH INFORMATION MANAGEMENT | Facility: CLINIC | Age: 48
End: 2023-08-22
Payer: COMMERCIAL

## 2023-08-29 ENCOUNTER — TRANSFERRED RECORDS (OUTPATIENT)
Dept: HEALTH INFORMATION MANAGEMENT | Facility: CLINIC | Age: 48
End: 2023-08-29
Payer: COMMERCIAL

## 2023-09-05 ENCOUNTER — TELEPHONE (OUTPATIENT)
Dept: FAMILY MEDICINE | Facility: CLINIC | Age: 48
End: 2023-09-05
Payer: COMMERCIAL

## 2023-09-05 NOTE — TELEPHONE ENCOUNTER
Patient Quality Outreach    Patient is due for the following:   Colon Cancer Screening    Next Steps:   Schedule Colonoscopy    Type of outreach:    Sent [employer] message.      Questions for provider review:    None           Haydee Pearson MA

## 2023-09-12 ENCOUNTER — TRANSFERRED RECORDS (OUTPATIENT)
Dept: HEALTH INFORMATION MANAGEMENT | Facility: CLINIC | Age: 48
End: 2023-09-12
Payer: COMMERCIAL

## 2024-02-05 ENCOUNTER — OFFICE VISIT (OUTPATIENT)
Dept: OPHTHALMOLOGY | Facility: CLINIC | Age: 49
End: 2024-02-05
Payer: COMMERCIAL

## 2024-02-05 DIAGNOSIS — H02.88A MEIBOMIAN GLAND DYSFUNCTION (MGD) OF UPPER AND LOWER LIDS OF BOTH EYES: ICD-10-CM

## 2024-02-05 DIAGNOSIS — H47.239 LARGE PHYSIOLOGIC CUPPING OF OPTIC DISC: ICD-10-CM

## 2024-02-05 DIAGNOSIS — H02.88B MEIBOMIAN GLAND DYSFUNCTION (MGD) OF UPPER AND LOWER LIDS OF BOTH EYES: ICD-10-CM

## 2024-02-05 DIAGNOSIS — H52.223 REGULAR ASTIGMATISM OF BOTH EYES: Primary | ICD-10-CM

## 2024-02-05 DIAGNOSIS — H52.4 PRESBYOPIA OF BOTH EYES: ICD-10-CM

## 2024-02-05 DIAGNOSIS — H04.123 DRY EYES, BILATERAL: ICD-10-CM

## 2024-02-05 PROCEDURE — 92015 DETERMINE REFRACTIVE STATE: CPT | Performed by: OPHTHALMOLOGY

## 2024-02-05 PROCEDURE — 92014 COMPRE OPH EXAM EST PT 1/>: CPT | Performed by: OPHTHALMOLOGY

## 2024-02-05 ASSESSMENT — VISUAL ACUITY
OS_CC: 20/20
OS_SC: J5
OS_CC+: -1
METHOD: SNELLEN - LINEAR
OD_SC: J5-2
OD_CC: 20/40

## 2024-02-05 ASSESSMENT — CONF VISUAL FIELD
OD_SUPERIOR_TEMPORAL_RESTRICTION: 0
OD_NORMAL: 1
OS_INFERIOR_NASAL_RESTRICTION: 0
OD_INFERIOR_TEMPORAL_RESTRICTION: 0
OS_SUPERIOR_TEMPORAL_RESTRICTION: 0
OS_SUPERIOR_NASAL_RESTRICTION: 0
OS_NORMAL: 1
OD_SUPERIOR_NASAL_RESTRICTION: 0
OD_INFERIOR_NASAL_RESTRICTION: 0
OS_INFERIOR_TEMPORAL_RESTRICTION: 0

## 2024-02-05 ASSESSMENT — CUP TO DISC RATIO
OS_RATIO: 0.5
OD_RATIO: 0.5

## 2024-02-05 ASSESSMENT — REFRACTION_MANIFEST
OD_SPHERE: PLANO
OD_ADD: +1.75
OS_SPHERE: PLANO
OD_AXIS: 005
OS_AXIS: 023
OS_CYLINDER: +0.50
OD_CYLINDER: +1.25
OS_ADD: +1.75

## 2024-02-05 ASSESSMENT — SLIT LAMP EXAM - LIDS
COMMENTS: NORMAL
COMMENTS: NORMAL

## 2024-02-05 ASSESSMENT — EXTERNAL EXAM - RIGHT EYE: OD_EXAM: NORMAL

## 2024-02-05 ASSESSMENT — EXTERNAL EXAM - LEFT EYE: OS_EXAM: NORMAL

## 2024-02-05 ASSESSMENT — TONOMETRY
OD_IOP_MMHG: 15
OS_IOP_MMHG: 14
IOP_METHOD: TONOPEN

## 2024-02-05 NOTE — NURSING NOTE
Chief Complaints and History of Present Illnesses   Patient presents with    COMPREHENSIVE EYE EXAM     Comprehensive exam     Chief Complaint(s) and History of Present Illness(es)       COMPREHENSIVE EYE EXAM              Laterality: both eyes    Associated symptoms: Negative for eye pain and discharge    Treatments tried: no treatments    Pain scale: 0/10    Comments: Comprehensive exam              Comments    Small print difficult to make out the past 5 months.   Distance vision each eye seem fine.   No discomfort each eye.     CHAD Parham COT 8:26 AM February 5, 2024

## 2024-02-05 NOTE — PROGRESS NOTES
HPI  Vasiliy Hay is a 49 year old male here for comprehensive eye exam.    HPI       COMPREHENSIVE EYE EXAM    In both eyes.  Associated symptoms include Negative for eye pain and discharge.  Treatments tried include no treatments.  Pain was noted as 0/10. Additional comments: Comprehensive exam             Comments    Small print difficult to make out the past 5 months.   Distance vision each eye seem fine.   No discomfort each eye.     CHAD Parham COT 8:26 AM February 5, 2024                 Last edited by Olga Raphael COT on 2/5/2024  8:26 AM.        On tablet most of workday.  Sometimes eyes are red in the morning.  No itching or burning.       PMH: hypertension   Past Medical History:   Diagnosis Date    CARDIOVASCULAR SCREENING; LDL GOAL LESS THAN 130     Hypertension     Positive TB test       POH: no glasses or contact lenses, no surgery, no trauma  Oc Meds: none  FH: Denies any glaucoma, age related macular degeneration, or other known eye diseases         Assessment & Plan      1. Regular astigmatism of both eyes - Both Eyes    2. Presbyopia of both eyes - Both Eyes    3. Meibomian gland dysfunction (MGD) of upper and lower lids of both eyes - Both Eyes    4. Dry eyes, bilateral - Both Eyes    5. 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      Astigmatism /presbyopia- both eyes   Comment: early presbyopia symptoms  Plan:  manifest refraction done and prescription for glasses given   Or can use +1.50 to +1.75    Meibomian gland dysfunction (MGD) of upper and lower lids of both eyes - Both Eyes  Dry eyes both eyes   Comment: mild   Plan: natural history discussed with patient   Warm compresses daily  Lid scrubs -Ocusoft foam on warm washcloth to clean lid margins carefully from side to side  Artificial tears 4-6 times per day as needed for discomfort  Lumify rather than visine as needed for redness     (H47.239) 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  Comment: normal  intraocular pressure, negative fh   Plan: follow clinically     -----------------------------------------------------------------------------------       Patient disposition:   Return in about 2 years (around 2/5/2026) for Comprehensive Exam. Patient to call sooner as needed.    Complete documentation of historical and exam elements from today's encounter can be found in the full encounter summary report (not reduplicated in this progress note). I personally obtained the chief complaint(s) and history of present illness.  I have confirmed and edited as necessary the CC, HPI, PMH/PSH, social history, FMH, ROS, and exam/neuro findings as obtained by the technician or others. I have examined this patient myself and I personally viewed the image(s) and studies listed above and the documentation reflects my findings and interpretation.     Valery Wang MD

## 2024-02-15 ENCOUNTER — TRANSFERRED RECORDS (OUTPATIENT)
Dept: HEALTH INFORMATION MANAGEMENT | Facility: CLINIC | Age: 49
End: 2024-02-15
Payer: COMMERCIAL

## 2024-09-14 ENCOUNTER — HEALTH MAINTENANCE LETTER (OUTPATIENT)
Age: 49
End: 2024-09-14

## 2024-09-26 DIAGNOSIS — I10 HTN, GOAL BELOW 140/90: ICD-10-CM

## 2024-09-30 NOTE — TELEPHONE ENCOUNTER
Patient Returning Call     Reason for call:  left message to schedule visit     Information relayed to patient:  left message vm for patientneeds to be seen prior to any more refills.     If patient calls back, please assist in scheduling visit with provider/clinic

## 2024-10-02 RX ORDER — LOSARTAN POTASSIUM 100 MG/1
100 TABLET ORAL DAILY
Qty: 90 TABLET | Refills: 0 | Status: SHIPPED | OUTPATIENT
Start: 2024-10-02

## 2024-10-02 NOTE — TELEPHONE ENCOUNTER
Patient is scheduled for 10/17/24 with Carlos, can patient be bridged for the losartan 100 mg?   Thanks!

## 2024-11-15 ENCOUNTER — OFFICE VISIT (OUTPATIENT)
Dept: FAMILY MEDICINE | Facility: CLINIC | Age: 49
End: 2024-11-15
Payer: COMMERCIAL

## 2024-11-15 VITALS
SYSTOLIC BLOOD PRESSURE: 130 MMHG | HEIGHT: 72 IN | WEIGHT: 186 LBS | BODY MASS INDEX: 25.19 KG/M2 | TEMPERATURE: 98.2 F | RESPIRATION RATE: 18 BRPM | HEART RATE: 64 BPM | OXYGEN SATURATION: 97 % | DIASTOLIC BLOOD PRESSURE: 78 MMHG

## 2024-11-15 DIAGNOSIS — Z13.220 LIPID SCREENING: ICD-10-CM

## 2024-11-15 DIAGNOSIS — Z12.5 SCREENING FOR PROSTATE CANCER: ICD-10-CM

## 2024-11-15 DIAGNOSIS — I10 HTN, GOAL BELOW 140/90: Primary | ICD-10-CM

## 2024-11-15 LAB
ALBUMIN SERPL BCG-MCNC: 4.5 G/DL (ref 3.5–5.2)
ALP SERPL-CCNC: 50 U/L (ref 40–150)
ALT SERPL W P-5'-P-CCNC: 36 U/L (ref 0–70)
ANION GAP SERPL CALCULATED.3IONS-SCNC: 13 MMOL/L (ref 7–15)
AST SERPL W P-5'-P-CCNC: 28 U/L (ref 0–45)
BILIRUB SERPL-MCNC: 1.1 MG/DL
BUN SERPL-MCNC: 6.2 MG/DL (ref 6–20)
CALCIUM SERPL-MCNC: 9.5 MG/DL (ref 8.8–10.4)
CHLORIDE SERPL-SCNC: 102 MMOL/L (ref 98–107)
CHOLEST SERPL-MCNC: 177 MG/DL
CREAT SERPL-MCNC: 0.74 MG/DL (ref 0.67–1.17)
EGFRCR SERPLBLD CKD-EPI 2021: >90 ML/MIN/1.73M2
FASTING STATUS PATIENT QL REPORTED: ABNORMAL
FASTING STATUS PATIENT QL REPORTED: NORMAL
GLUCOSE SERPL-MCNC: 94 MG/DL (ref 70–99)
HCO3 SERPL-SCNC: 26 MMOL/L (ref 22–29)
HDLC SERPL-MCNC: 52 MG/DL
LDLC SERPL CALC-MCNC: 102 MG/DL
NONHDLC SERPL-MCNC: 125 MG/DL
POTASSIUM SERPL-SCNC: 4.9 MMOL/L (ref 3.4–5.3)
PROT SERPL-MCNC: 7.2 G/DL (ref 6.4–8.3)
PSA SERPL DL<=0.01 NG/ML-MCNC: 0.86 NG/ML (ref 0–2.5)
SODIUM SERPL-SCNC: 141 MMOL/L (ref 135–145)
TRIGL SERPL-MCNC: 116 MG/DL

## 2024-11-15 PROCEDURE — 36415 COLL VENOUS BLD VENIPUNCTURE: CPT | Performed by: NURSE PRACTITIONER

## 2024-11-15 PROCEDURE — 80061 LIPID PANEL: CPT | Performed by: NURSE PRACTITIONER

## 2024-11-15 PROCEDURE — 80053 COMPREHEN METABOLIC PANEL: CPT | Performed by: NURSE PRACTITIONER

## 2024-11-15 PROCEDURE — G0103 PSA SCREENING: HCPCS | Performed by: NURSE PRACTITIONER

## 2024-11-15 PROCEDURE — 99213 OFFICE O/P EST LOW 20 MIN: CPT | Performed by: NURSE PRACTITIONER

## 2024-11-15 RX ORDER — LOSARTAN POTASSIUM 100 MG/1
100 TABLET ORAL DAILY
Qty: 90 TABLET | Refills: 3 | Status: SHIPPED | OUTPATIENT
Start: 2024-11-15

## 2024-11-15 ASSESSMENT — PAIN SCALES - GENERAL: PAINLEVEL_OUTOF10: NO PAIN (0)

## 2024-11-15 NOTE — PROGRESS NOTES
"Assessment & Plan     ICD-10-CM    1. HTN, goal below 140/90  I10 losartan (COZAAR) 100 MG tablet     Comprehensive metabolic panel (BMP + Alb, Alk Phos, ALT, AST, Total. Bili, TP)     Comprehensive metabolic panel (BMP + Alb, Alk Phos, ALT, AST, Total. Bili, TP)      2. Lipid screening  Z13.220 Lipid panel     Lipid panel      3. Screening for prostate cancer  Z12.5 PSA, screen     PSA, screen        Colonoscopy up-to-date.  Continue same antihypertensives.  Update screening/med monitoring labs.    Subjective     HPI     Doing well.  Blood pressure shows good control.  Asymptomatic.  No concerns today.    Review of Systems - negative except for what's listed in the HPI      Objective    /78 (BP Location: Right arm, Patient Position: Sitting, Cuff Size: Adult Regular)   Pulse 64   Temp 98.2  F (36.8  C) (Oral)   Resp 18   Ht 1.816 m (5' 11.5\")   Wt 84.4 kg (186 lb)   SpO2 97%   BMI 25.58 kg/m    Physical Exam   General appearance - alert, well appearing, and in no distress  Mental status - alert, oriented to person, place, and time  Lymphatics - no palpable lymphadenopathy  Chest - clear to auscultation, no wheezes, rales or rhonchi, symmetric air entry  Heart - normal rate and regular rhythm, S1 and S2 normal, no murmurs noted  Abdomen - soft, nontender, nondistended, no masses or organomegaly  Neurological - alert, oriented, normal speech, no focal findings or movement disorder noted, neck supple without rigidity, cranial nerves II through XII intact, motor and sensory grossly normal bilaterally, normal muscle tone, no tremors, strength 5/5  Extremities - peripheral pulses normal, no peripheral edema  Skin - normal coloration and turgor.    Carlos Hines, CNP    This note has been dictated using voice recognition software. Any grammatical or context distortions are unintentional and inherent to the software.    "

## 2024-11-15 NOTE — PATIENT INSTRUCTIONS
You look good today!    You are going to update your annual labs today to make sure everything is going okay.    I made sure to get refills of your losartan for the year sent to the pharmacy.

## 2025-01-04 DIAGNOSIS — I10 HTN, GOAL BELOW 140/90: ICD-10-CM

## 2025-01-06 RX ORDER — LOSARTAN POTASSIUM 100 MG/1
100 TABLET ORAL DAILY
Qty: 90 TABLET | Refills: 3 | OUTPATIENT
Start: 2025-01-06